# Patient Record
Sex: FEMALE | Race: WHITE | Employment: OTHER | URBAN - METROPOLITAN AREA
[De-identification: names, ages, dates, MRNs, and addresses within clinical notes are randomized per-mention and may not be internally consistent; named-entity substitution may affect disease eponyms.]

---

## 2017-02-01 ENCOUNTER — TELEPHONE (OUTPATIENT)
Dept: INTERNAL MEDICINE CLINIC | Age: 77
End: 2017-02-01

## 2017-02-01 RX ORDER — AZITHROMYCIN 250 MG/1
TABLET, FILM COATED ORAL
Qty: 1 PACKET | Refills: 0 | Status: SHIPPED | OUTPATIENT
Start: 2017-02-01 | End: 2017-02-11

## 2017-02-24 RX ORDER — SIMVASTATIN 20 MG
TABLET ORAL
Qty: 90 TABLET | Refills: 0 | Status: SHIPPED | OUTPATIENT
Start: 2017-02-24 | End: 2017-07-12 | Stop reason: SDUPTHER

## 2017-02-24 RX ORDER — LEVOTHYROXINE SODIUM 0.07 MG/1
TABLET ORAL
Qty: 90 TABLET | Refills: 0 | Status: SHIPPED | OUTPATIENT
Start: 2017-02-24 | End: 2017-07-12 | Stop reason: SDUPTHER

## 2017-02-24 RX ORDER — METOPROLOL TARTRATE 50 MG/1
TABLET, FILM COATED ORAL
Qty: 180 TABLET | Refills: 0 | Status: SHIPPED | OUTPATIENT
Start: 2017-02-24 | End: 2017-07-12 | Stop reason: SDUPTHER

## 2017-03-10 ENCOUNTER — OFFICE VISIT (OUTPATIENT)
Dept: INTERNAL MEDICINE CLINIC | Age: 77
End: 2017-03-10

## 2017-03-10 VITALS
RESPIRATION RATE: 14 BRPM | SYSTOLIC BLOOD PRESSURE: 120 MMHG | WEIGHT: 129 LBS | HEIGHT: 67 IN | DIASTOLIC BLOOD PRESSURE: 80 MMHG | HEART RATE: 80 BPM | BODY MASS INDEX: 20.25 KG/M2

## 2017-03-10 DIAGNOSIS — E03.9 ACQUIRED HYPOTHYROIDISM: ICD-10-CM

## 2017-03-10 DIAGNOSIS — M81.0 OSTEOPOROSIS: ICD-10-CM

## 2017-03-10 DIAGNOSIS — H34.11 CENTRAL RETINAL ARTERY OCCLUSION, RIGHT: ICD-10-CM

## 2017-03-10 DIAGNOSIS — E78.5 HYPERLIPIDEMIA, UNSPECIFIED HYPERLIPIDEMIA TYPE: Primary | ICD-10-CM

## 2017-03-10 DIAGNOSIS — M72.0 DUPUYTREN'S CONTRACTURE OF BOTH HANDS: ICD-10-CM

## 2017-03-10 LAB
A/G RATIO: 1.6 (ref 1.1–2.2)
ALBUMIN SERPL-MCNC: 4.6 G/DL (ref 3.4–5)
ALP BLD-CCNC: 48 U/L (ref 40–129)
ALT SERPL-CCNC: 13 U/L (ref 10–40)
ANION GAP SERPL CALCULATED.3IONS-SCNC: 14 MMOL/L (ref 3–16)
AST SERPL-CCNC: 23 U/L (ref 15–37)
BASOPHILS ABSOLUTE: 0 K/UL (ref 0–0.2)
BASOPHILS RELATIVE PERCENT: 0.8 %
BILIRUB SERPL-MCNC: 0.4 MG/DL (ref 0–1)
BUN BLDV-MCNC: 14 MG/DL (ref 7–20)
CALCIUM SERPL-MCNC: 10.2 MG/DL (ref 8.3–10.6)
CHLORIDE BLD-SCNC: 97 MMOL/L (ref 99–110)
CO2: 27 MMOL/L (ref 21–32)
CREAT SERPL-MCNC: 0.8 MG/DL (ref 0.6–1.2)
EOSINOPHILS ABSOLUTE: 0.1 K/UL (ref 0–0.6)
EOSINOPHILS RELATIVE PERCENT: 1.5 %
GFR AFRICAN AMERICAN: >60
GFR NON-AFRICAN AMERICAN: >60
GLOBULIN: 2.9 G/DL
GLUCOSE BLD-MCNC: 92 MG/DL (ref 70–99)
HCT VFR BLD CALC: 41.9 % (ref 36–48)
HEMOGLOBIN: 13.6 G/DL (ref 12–16)
LYMPHOCYTES ABSOLUTE: 1.3 K/UL (ref 1–5.1)
LYMPHOCYTES RELATIVE PERCENT: 25.5 %
MCH RBC QN AUTO: 28.9 PG (ref 26–34)
MCHC RBC AUTO-ENTMCNC: 32.5 G/DL (ref 31–36)
MCV RBC AUTO: 88.9 FL (ref 80–100)
MONOCYTES ABSOLUTE: 0.6 K/UL (ref 0–1.3)
MONOCYTES RELATIVE PERCENT: 11.2 %
NEUTROPHILS ABSOLUTE: 3.1 K/UL (ref 1.7–7.7)
NEUTROPHILS RELATIVE PERCENT: 61 %
PDW BLD-RTO: 14 % (ref 12.4–15.4)
PLATELET # BLD: 255 K/UL (ref 135–450)
PMV BLD AUTO: 9.4 FL (ref 5–10.5)
POTASSIUM SERPL-SCNC: 4.8 MMOL/L (ref 3.5–5.1)
RBC # BLD: 4.71 M/UL (ref 4–5.2)
SODIUM BLD-SCNC: 138 MMOL/L (ref 136–145)
TOTAL PROTEIN: 7.5 G/DL (ref 6.4–8.2)
TSH SERPL DL<=0.05 MIU/L-ACNC: 1.24 UIU/ML (ref 0.27–4.2)
WBC # BLD: 5.1 K/UL (ref 4–11)

## 2017-03-10 PROCEDURE — 99214 OFFICE O/P EST MOD 30 MIN: CPT | Performed by: INTERNAL MEDICINE

## 2017-03-10 ASSESSMENT — ENCOUNTER SYMPTOMS
ABDOMINAL PAIN: 0
SHORTNESS OF BREATH: 0
RHINORRHEA: 0
WHEEZING: 0
NAUSEA: 0
VOMITING: 0

## 2017-03-10 ASSESSMENT — PATIENT HEALTH QUESTIONNAIRE - PHQ9
SUM OF ALL RESPONSES TO PHQ9 QUESTIONS 1 & 2: 0
SUM OF ALL RESPONSES TO PHQ QUESTIONS 1-9: 0
2. FEELING DOWN, DEPRESSED OR HOPELESS: 0
1. LITTLE INTEREST OR PLEASURE IN DOING THINGS: 0

## 2017-08-09 ENCOUNTER — OFFICE VISIT (OUTPATIENT)
Dept: INTERNAL MEDICINE CLINIC | Age: 77
End: 2017-08-09

## 2017-08-09 VITALS
DIASTOLIC BLOOD PRESSURE: 80 MMHG | HEART RATE: 70 BPM | RESPIRATION RATE: 14 BRPM | BODY MASS INDEX: 20.56 KG/M2 | HEIGHT: 67 IN | SYSTOLIC BLOOD PRESSURE: 138 MMHG | WEIGHT: 131 LBS

## 2017-08-09 DIAGNOSIS — H34.11 CENTRAL RETINAL ARTERY OCCLUSION, RIGHT: ICD-10-CM

## 2017-08-09 DIAGNOSIS — M81.0 OSTEOPOROSIS WITHOUT CURRENT PATHOLOGICAL FRACTURE, UNSPECIFIED OSTEOPOROSIS TYPE: ICD-10-CM

## 2017-08-09 DIAGNOSIS — E78.5 HYPERLIPIDEMIA, UNSPECIFIED HYPERLIPIDEMIA TYPE: Primary | ICD-10-CM

## 2017-08-09 DIAGNOSIS — E78.5 HYPERLIPIDEMIA, UNSPECIFIED HYPERLIPIDEMIA TYPE: ICD-10-CM

## 2017-08-09 DIAGNOSIS — E03.9 ACQUIRED HYPOTHYROIDISM: ICD-10-CM

## 2017-08-09 DIAGNOSIS — M72.0 DUPUYTREN'S CONTRACTURE OF BOTH HANDS: ICD-10-CM

## 2017-08-09 LAB
A/G RATIO: 1.9 (ref 1.1–2.2)
ALBUMIN SERPL-MCNC: 5 G/DL (ref 3.4–5)
ALP BLD-CCNC: 57 U/L (ref 40–129)
ALT SERPL-CCNC: 22 U/L (ref 10–40)
ANION GAP SERPL CALCULATED.3IONS-SCNC: 18 MMOL/L (ref 3–16)
AST SERPL-CCNC: 28 U/L (ref 15–37)
BASOPHILS ABSOLUTE: 0 K/UL (ref 0–0.2)
BASOPHILS RELATIVE PERCENT: 0.6 %
BILIRUB SERPL-MCNC: 0.4 MG/DL (ref 0–1)
BUN BLDV-MCNC: 9 MG/DL (ref 7–20)
CALCIUM SERPL-MCNC: 9.9 MG/DL (ref 8.3–10.6)
CHLORIDE BLD-SCNC: 99 MMOL/L (ref 99–110)
CHOLESTEROL, TOTAL: 211 MG/DL (ref 0–199)
CO2: 28 MMOL/L (ref 21–32)
CREAT SERPL-MCNC: 0.7 MG/DL (ref 0.6–1.2)
EOSINOPHILS ABSOLUTE: 0.2 K/UL (ref 0–0.6)
EOSINOPHILS RELATIVE PERCENT: 3.8 %
GFR AFRICAN AMERICAN: >60
GFR NON-AFRICAN AMERICAN: >60
GLOBULIN: 2.6 G/DL
GLUCOSE BLD-MCNC: 92 MG/DL (ref 70–99)
HCT VFR BLD CALC: 40.9 % (ref 36–48)
HDLC SERPL-MCNC: 75 MG/DL (ref 40–60)
HEMOGLOBIN: 13.6 G/DL (ref 12–16)
LDL CHOLESTEROL CALCULATED: 107 MG/DL
LYMPHOCYTES ABSOLUTE: 1.4 K/UL (ref 1–5.1)
LYMPHOCYTES RELATIVE PERCENT: 28.3 %
MCH RBC QN AUTO: 29.4 PG (ref 26–34)
MCHC RBC AUTO-ENTMCNC: 33.2 G/DL (ref 31–36)
MCV RBC AUTO: 88.5 FL (ref 80–100)
MONOCYTES ABSOLUTE: 0.6 K/UL (ref 0–1.3)
MONOCYTES RELATIVE PERCENT: 12.5 %
NEUTROPHILS ABSOLUTE: 2.7 K/UL (ref 1.7–7.7)
NEUTROPHILS RELATIVE PERCENT: 54.8 %
PDW BLD-RTO: 14.3 % (ref 12.4–15.4)
PLATELET # BLD: 222 K/UL (ref 135–450)
PMV BLD AUTO: 9.5 FL (ref 5–10.5)
POTASSIUM SERPL-SCNC: 4.9 MMOL/L (ref 3.5–5.1)
RBC # BLD: 4.62 M/UL (ref 4–5.2)
SODIUM BLD-SCNC: 145 MMOL/L (ref 136–145)
TOTAL PROTEIN: 7.6 G/DL (ref 6.4–8.2)
TRIGL SERPL-MCNC: 143 MG/DL (ref 0–150)
TSH SERPL DL<=0.05 MIU/L-ACNC: 1.69 UIU/ML (ref 0.27–4.2)
URIC ACID, SERUM: 4.8 MG/DL (ref 2.6–6)
VLDLC SERPL CALC-MCNC: 29 MG/DL
WBC # BLD: 4.9 K/UL (ref 4–11)

## 2017-08-09 PROCEDURE — 99214 OFFICE O/P EST MOD 30 MIN: CPT | Performed by: INTERNAL MEDICINE

## 2017-08-09 RX ORDER — SIMVASTATIN 20 MG
TABLET ORAL
Qty: 90 TABLET | Refills: 0 | Status: SHIPPED | OUTPATIENT
Start: 2017-08-09 | End: 2018-04-20 | Stop reason: SDUPTHER

## 2017-08-09 RX ORDER — METOPROLOL TARTRATE 50 MG/1
TABLET, FILM COATED ORAL
Qty: 180 TABLET | Refills: 0 | Status: SHIPPED | OUTPATIENT
Start: 2017-08-09 | End: 2018-06-18 | Stop reason: SDUPTHER

## 2017-08-09 RX ORDER — LEVOTHYROXINE SODIUM 0.07 MG/1
TABLET ORAL
Qty: 90 TABLET | Refills: 0 | Status: SHIPPED | OUTPATIENT
Start: 2017-08-09 | End: 2018-04-20 | Stop reason: SDUPTHER

## 2017-08-09 ASSESSMENT — ENCOUNTER SYMPTOMS
WHEEZING: 0
VOMITING: 0
RHINORRHEA: 0
ABDOMINAL PAIN: 0
SHORTNESS OF BREATH: 0
NAUSEA: 0

## 2017-09-13 ENCOUNTER — OFFICE VISIT (OUTPATIENT)
Dept: ORTHOPEDIC SURGERY | Age: 77
End: 2017-09-13

## 2017-09-13 ENCOUNTER — TELEPHONE (OUTPATIENT)
Dept: ORTHOPEDIC SURGERY | Age: 77
End: 2017-09-13

## 2017-09-13 VITALS — WEIGHT: 130.95 LBS | BODY MASS INDEX: 20.55 KG/M2 | HEIGHT: 67 IN

## 2017-09-13 DIAGNOSIS — M72.0 DUPUYTREN'S CONTRACTURE OF LEFT HAND: Primary | ICD-10-CM

## 2017-09-13 PROCEDURE — 99213 OFFICE O/P EST LOW 20 MIN: CPT | Performed by: ORTHOPAEDIC SURGERY

## 2017-09-20 ENCOUNTER — PAT TELEPHONE (OUTPATIENT)
Dept: PREADMISSION TESTING | Age: 77
End: 2017-09-20

## 2017-09-20 ENCOUNTER — OFFICE VISIT (OUTPATIENT)
Dept: INTERNAL MEDICINE CLINIC | Age: 77
End: 2017-09-20

## 2017-09-20 VITALS
HEART RATE: 61 BPM | RESPIRATION RATE: 14 BRPM | BODY MASS INDEX: 20.4 KG/M2 | DIASTOLIC BLOOD PRESSURE: 80 MMHG | WEIGHT: 130 LBS | SYSTOLIC BLOOD PRESSURE: 142 MMHG

## 2017-09-20 DIAGNOSIS — M72.0 DUPUYTREN'S CONTRACTURE OF BOTH HANDS: ICD-10-CM

## 2017-09-20 DIAGNOSIS — M81.0 OSTEOPOROSIS, UNSPECIFIED OSTEOPOROSIS TYPE, UNSPECIFIED PATHOLOGICAL FRACTURE PRESENCE: ICD-10-CM

## 2017-09-20 DIAGNOSIS — Z01.818 PRE-OP EXAM: Primary | ICD-10-CM

## 2017-09-20 DIAGNOSIS — I10 BENIGN ESSENTIAL HYPERTENSION: ICD-10-CM

## 2017-09-20 DIAGNOSIS — E78.5 HYPERLIPIDEMIA, UNSPECIFIED HYPERLIPIDEMIA TYPE: ICD-10-CM

## 2017-09-20 DIAGNOSIS — E03.9 ACQUIRED HYPOTHYROIDISM: ICD-10-CM

## 2017-09-20 PROCEDURE — 93000 ELECTROCARDIOGRAM COMPLETE: CPT | Performed by: NURSE PRACTITIONER

## 2017-09-20 PROCEDURE — 99213 OFFICE O/P EST LOW 20 MIN: CPT | Performed by: NURSE PRACTITIONER

## 2017-09-20 RX ORDER — IBUPROFEN 200 MG
1 CAPSULE ORAL DAILY
COMMUNITY

## 2017-09-20 RX ORDER — PREDNISONE 20 MG/1
20 TABLET ORAL DAILY
Qty: 5 TABLET | Refills: 0 | Status: SHIPPED | OUTPATIENT
Start: 2017-09-20 | End: 2017-09-25

## 2017-09-28 ENCOUNTER — HOSPITAL ENCOUNTER (OUTPATIENT)
Dept: SURGERY | Age: 77
Discharge: HOME | End: 2017-09-28
Attending: ORTHOPAEDIC SURGERY | Admitting: ORTHOPAEDIC SURGERY

## 2017-09-28 VITALS
HEIGHT: 67 IN | RESPIRATION RATE: 14 BRPM | SYSTOLIC BLOOD PRESSURE: 144 MMHG | BODY MASS INDEX: 20.4 KG/M2 | OXYGEN SATURATION: 97 % | WEIGHT: 130 LBS | HEART RATE: 61 BPM | TEMPERATURE: 98.2 F | DIASTOLIC BLOOD PRESSURE: 80 MMHG

## 2017-09-28 RX ORDER — LABETALOL HYDROCHLORIDE 5 MG/ML
5 INJECTION, SOLUTION INTRAVENOUS EVERY 10 MIN PRN
Status: DISCONTINUED | OUTPATIENT
Start: 2017-09-28 | End: 2017-09-29 | Stop reason: HOSPADM

## 2017-09-28 RX ORDER — DEXTROSE MONOHYDRATE 50 MG/ML
INJECTION, SOLUTION INTRAVENOUS
Status: COMPLETED
Start: 2017-09-28 | End: 2017-09-28

## 2017-09-28 RX ORDER — HYDROMORPHONE HCL 110MG/55ML
0.5 PATIENT CONTROLLED ANALGESIA SYRINGE INTRAVENOUS EVERY 5 MIN PRN
Status: DISCONTINUED | OUTPATIENT
Start: 2017-09-28 | End: 2017-09-29 | Stop reason: HOSPADM

## 2017-09-28 RX ORDER — SODIUM CHLORIDE 0.9 % (FLUSH) 0.9 %
10 SYRINGE (ML) INJECTION EVERY 12 HOURS SCHEDULED
Status: DISCONTINUED | OUTPATIENT
Start: 2017-09-28 | End: 2017-09-29 | Stop reason: HOSPADM

## 2017-09-28 RX ORDER — TRAMADOL HYDROCHLORIDE 50 MG/1
50 TABLET ORAL EVERY 8 HOURS PRN
Qty: 20 TABLET | Refills: 0 | Status: SHIPPED | OUTPATIENT
Start: 2017-09-28 | End: 2017-10-08

## 2017-09-28 RX ORDER — OXYCODONE HYDROCHLORIDE AND ACETAMINOPHEN 5; 325 MG/1; MG/1
1 TABLET ORAL PRN
Status: COMPLETED | OUTPATIENT
Start: 2017-09-28 | End: 2017-09-28

## 2017-09-28 RX ORDER — MEPERIDINE HYDROCHLORIDE 25 MG/ML
12.5 INJECTION INTRAMUSCULAR; INTRAVENOUS; SUBCUTANEOUS EVERY 5 MIN PRN
Status: DISCONTINUED | OUTPATIENT
Start: 2017-09-28 | End: 2017-09-29 | Stop reason: HOSPADM

## 2017-09-28 RX ORDER — OXYCODONE HYDROCHLORIDE AND ACETAMINOPHEN 5; 325 MG/1; MG/1
2 TABLET ORAL PRN
Status: COMPLETED | OUTPATIENT
Start: 2017-09-28 | End: 2017-09-28

## 2017-09-28 RX ORDER — DIPHENHYDRAMINE HYDROCHLORIDE 50 MG/ML
12.5 INJECTION INTRAMUSCULAR; INTRAVENOUS
Status: ACTIVE | OUTPATIENT
Start: 2017-09-28 | End: 2017-09-28

## 2017-09-28 RX ORDER — LIDOCAINE HYDROCHLORIDE 10 MG/ML
1 INJECTION, SOLUTION EPIDURAL; INFILTRATION; INTRACAUDAL; PERINEURAL
Status: COMPLETED | OUTPATIENT
Start: 2017-09-28 | End: 2017-09-28

## 2017-09-28 RX ORDER — SODIUM CHLORIDE 0.9 % (FLUSH) 0.9 %
10 SYRINGE (ML) INJECTION PRN
Status: DISCONTINUED | OUTPATIENT
Start: 2017-09-28 | End: 2017-09-29 | Stop reason: HOSPADM

## 2017-09-28 RX ORDER — PROMETHAZINE HYDROCHLORIDE 25 MG/ML
6.25 INJECTION, SOLUTION INTRAMUSCULAR; INTRAVENOUS
Status: ACTIVE | OUTPATIENT
Start: 2017-09-28 | End: 2017-09-28

## 2017-09-28 RX ORDER — ONDANSETRON 2 MG/ML
4 INJECTION INTRAMUSCULAR; INTRAVENOUS
Status: ACTIVE | OUTPATIENT
Start: 2017-09-28 | End: 2017-09-28

## 2017-09-28 RX ORDER — SODIUM CHLORIDE, SODIUM LACTATE, POTASSIUM CHLORIDE, CALCIUM CHLORIDE 600; 310; 30; 20 MG/100ML; MG/100ML; MG/100ML; MG/100ML
INJECTION, SOLUTION INTRAVENOUS CONTINUOUS
Status: DISCONTINUED | OUTPATIENT
Start: 2017-09-28 | End: 2017-09-29 | Stop reason: HOSPADM

## 2017-09-28 RX ORDER — HYDROMORPHONE HCL 110MG/55ML
0.25 PATIENT CONTROLLED ANALGESIA SYRINGE INTRAVENOUS EVERY 5 MIN PRN
Status: DISCONTINUED | OUTPATIENT
Start: 2017-09-28 | End: 2017-09-29 | Stop reason: HOSPADM

## 2017-09-28 RX ORDER — LIDOCAINE HYDROCHLORIDE 10 MG/ML
INJECTION, SOLUTION EPIDURAL; INFILTRATION; INTRACAUDAL; PERINEURAL
Status: DISPENSED
Start: 2017-09-28 | End: 2017-09-28

## 2017-09-28 RX ORDER — HYDRALAZINE HYDROCHLORIDE 20 MG/ML
5 INJECTION INTRAMUSCULAR; INTRAVENOUS EVERY 10 MIN PRN
Status: DISCONTINUED | OUTPATIENT
Start: 2017-09-28 | End: 2017-09-29 | Stop reason: HOSPADM

## 2017-09-28 RX ORDER — SODIUM CHLORIDE, SODIUM LACTATE, POTASSIUM CHLORIDE, CALCIUM CHLORIDE 600; 310; 30; 20 MG/100ML; MG/100ML; MG/100ML; MG/100ML
INJECTION, SOLUTION INTRAVENOUS
Status: COMPLETED
Start: 2017-09-28 | End: 2017-09-28

## 2017-09-28 RX ORDER — MORPHINE SULFATE 10 MG/ML
1 INJECTION, SOLUTION INTRAMUSCULAR; INTRAVENOUS EVERY 5 MIN PRN
Status: DISCONTINUED | OUTPATIENT
Start: 2017-09-28 | End: 2017-09-29 | Stop reason: HOSPADM

## 2017-09-28 RX ORDER — MORPHINE SULFATE 10 MG/ML
2 INJECTION, SOLUTION INTRAMUSCULAR; INTRAVENOUS EVERY 5 MIN PRN
Status: DISCONTINUED | OUTPATIENT
Start: 2017-09-28 | End: 2017-09-29 | Stop reason: HOSPADM

## 2017-09-28 RX ADMIN — OXYCODONE HYDROCHLORIDE AND ACETAMINOPHEN 1 TABLET: 5; 325 TABLET ORAL at 12:26

## 2017-09-28 RX ADMIN — LIDOCAINE HYDROCHLORIDE 1 ML: 10 INJECTION, SOLUTION EPIDURAL; INFILTRATION; INTRACAUDAL; PERINEURAL at 08:52

## 2017-09-28 RX ADMIN — DEXTROSE MONOHYDRATE: 50 INJECTION, SOLUTION INTRAVENOUS at 10:22

## 2017-09-28 RX ADMIN — SODIUM CHLORIDE, SODIUM LACTATE, POTASSIUM CHLORIDE, CALCIUM CHLORIDE 1000 ML: 600; 310; 30; 20 INJECTION, SOLUTION INTRAVENOUS at 08:51

## 2017-09-28 ASSESSMENT — PAIN DESCRIPTION - FREQUENCY: FREQUENCY: CONTINUOUS

## 2017-09-28 ASSESSMENT — PAIN DESCRIPTION - DESCRIPTORS
DESCRIPTORS: ACHING
DESCRIPTORS: ACHING;DULL
DESCRIPTORS: ACHING;OTHER (COMMENT)
DESCRIPTORS: BURNING;DULL

## 2017-09-28 ASSESSMENT — PAIN SCALES - GENERAL
PAINLEVEL_OUTOF10: 4
PAINLEVEL_OUTOF10: 2
PAINLEVEL_OUTOF10: 3

## 2017-09-28 ASSESSMENT — PAIN DESCRIPTION - LOCATION
LOCATION: HAND

## 2017-09-28 ASSESSMENT — PAIN DESCRIPTION - PAIN TYPE
TYPE: OTHER (COMMENT)
TYPE: SURGICAL PAIN

## 2017-09-28 ASSESSMENT — PAIN DESCRIPTION - ORIENTATION
ORIENTATION: LEFT

## 2017-09-28 ASSESSMENT — PAIN - FUNCTIONAL ASSESSMENT: PAIN_FUNCTIONAL_ASSESSMENT: 0-10

## 2017-10-06 ENCOUNTER — OFFICE VISIT (OUTPATIENT)
Dept: ORTHOPEDIC SURGERY | Age: 77
End: 2017-10-06

## 2017-10-06 VITALS — HEIGHT: 67 IN | BODY MASS INDEX: 20.42 KG/M2 | WEIGHT: 130.07 LBS

## 2017-10-06 DIAGNOSIS — M72.0 DUPUYTREN'S CONTRACTURE OF LEFT HAND: Primary | ICD-10-CM

## 2017-10-06 PROCEDURE — 99024 POSTOP FOLLOW-UP VISIT: CPT | Performed by: ORTHOPAEDIC SURGERY

## 2017-10-06 NOTE — MR AVS SNAPSHOT
After Visit Summary             Ez Tate   10/6/2017 10:30 AM   Office Visit    Description:  Female : 1940   Provider:  Marcia Sheridan MD   Department:  [address] [place] and Future Appointments         Below is a list of your follow-up and future appointments. This may not be a complete list as you may have made appointments directly with providers that we are not aware of or your providers may have made some for you. Please call your providers to confirm appointments. It is important to keep your appointments. Please bring your current insurance card, photo ID, co-pay, and all medication bottles to your appointment. If self-pay, payment is expected at the time of service. Your To-Do List     Future Appointments Provider Department Dept Phone    2017 10:45 AM Marcia Sheridan MD [facility] [phone]    Follow-Up    Return in about 6 weeks (around 2017).          Information from Your Visit        Department     Name Address Phone Fax    [facility] [address] [phone]      You Were Seen for:         Comments    Dupuytren's contracture of left hand   [[id number]]         Vital Signs     Height Weight Body Mass Index Smoking Status          5' 6.93\" (1.7 m) 130 lb 1.1 oz (59 kg) 20.42 kg/m2 Never Smoker           Medications and Orders      Your Current Medications Are              traMADol (ULTRAM) 50 MG tablet Take 1 tablet by mouth every 8 hours as needed for Pain    calcium carbonate (OYSTER SHELL CALCIUM 500 MG) 1250 (500 Ca) MG tablet Take 1 tablet by mouth daily    vitamin D 1000 units CAPS Take by mouth    levothyroxine (SYNTHROID) 75 MCG tablet TAKE 1 TABLET EVERY DAY    simvastatin (ZOCOR) 20 MG tablet TAKE 1 TABLET EVERY EVENING [email or web address]  2. Click on the Sign Up Now link in the Sign In box. You will see the New Member Sign Up page. 3. Enter your [email or web address] Access Code exactly as it appears below. You will not need to use this code after youve completed the sign-up process. If you do not sign up before the expiration date, you must request a new code. Iotera Access Code: [email or web address]  Expires: 10/8/2017  8:59 AM    4. Enter your Social Security Number ([id number]) and Date of Birth (mm/dd/yyyy) as indicated and click Submit. You will be taken to the next sign-up page. 5. Create a GroupVisual[employer]iot ID. This will be your Iotera login ID and cannot be changed, so think of one that is secure and easy to remember. 6. Create a [employer] password. You can change your password at any time. 7. Enter your Password Reset Question and Answer. This can be used at a later time if you forget your password. 8. Enter your e-mail address. You will receive e-mail notification when new information is available in [address] 9. Click Sign Up. You can now view your medical record. Additional Information  If you have questions, please contact the physician practice where you receive care. Remember, Iotera is NOT to be used for urgent needs. For medical emergencies, dial 911. For questions regarding your Iot[employer] account call [phone]. If you have a clinical question, please call your doctor's office.

## 2017-10-06 NOTE — PROGRESS NOTES
Chief Complaint   Patient presents with    Post-Op Check     Fasciectomy of left hand with removal of Dupuytren disease IF, palm and left thumb sx 9/28/2017       HISTORY OF PRESENT ILLNESS:  Narda Holcomb is a 76 y.o.  patient here for repeat evaluation after undergoing left hand Dupuytren's excision 8 days ago. She feels minimal pain. She feels stiffness. She states that there is some tingling of the index finger but already improving. Overall she is happy. ROS:  ROS neg     Past medical history is reviewed again today, no changes to report    PHYSICAL EXAMINATION:  Patient is alert and pleasant, in no acute distress. The affected extremity is examined today. Left hand reveals well-healing surgical sites. Mild swelling. Ecchymosis is noted. Compartments soft. No sign of infection or dehiscence. Good alignment of the hand at rest.  Palpation in the webspace between the index and middle finger reveals resolution of the cord. The incision in the palm and along the volar index finger are both healing well. Full extension is present. Stiffness with flexion but no clicking. Fingers are warm and sensate. There is sensory function on the volar surface of the left index finger, radially and ulnarly but she reports that it is slightly decreased to the other digits. X-rays: None needed    Pathology report is reviewed, impression:  Dupuytren's disease    IMPRESSION AND PLAN: Dupuytren's disease left hand, 1 week following excision  Doing well. We will continue with our current care plan. Sutures are absorbable. The wounds are cleansed today and covered with Steri-Strips. We will also provide an Ace bandage. I did recommend therapy, she declines. Therefore we did provide range of motion and edema control recommendations in office. In addition we have printed a range of motion sheet to be done at home. She will follow up in 6 weeks unless needed sooner.   If she is not getting her motion back or function back, she understands to contact my office and we would proceed with the outpatient postoperative hand therapy. All questions and concerns were addressed today. Patient is in agreement with the plan.         José Miguel Acharya MD  Hand & Upper Extremity Surgery  [address] [facility] partner of [facility] ([facility])

## 2017-10-18 ENCOUNTER — TELEPHONE (OUTPATIENT)
Dept: INTERNAL MEDICINE CLINIC | Age: 77
End: 2017-10-18

## 2017-10-18 NOTE — TELEPHONE ENCOUNTER
----- Message from Alley Childs MD sent at 10/18/2017 10:28 AM EDT -----  Contact: pt [phone]  Continue to monitor and call back on Friday morning  I am have to add another BP med  ----- Message -----  From: Jelani Pierre  Sent: 10/18/2017  10:22 AM  To: Alley Childs MD    Has been going on for the last 2-3 days. ----- Message -----  From: Alley Childs MD  Sent: 10/18/2017  10:15 AM  To: Jelani Pierre    Is this the first time it has happened. ----- Message -----  From: Connie Yulianabird  Sent: 10/18/2017   9:40 AM  To: Alley Childs MD    Took bp last night and this morning and it was 171/98 this morning. Pulse is good but the blood pressure has been around 170 to 178/98 to 102. Tried talking her in to coming in today but she said she didn't want to.   Wanted to see what you said first.     Last visit: 9-20-17  Future visit: none

## 2017-10-20 ENCOUNTER — TELEPHONE (OUTPATIENT)
Dept: INTERNAL MEDICINE CLINIC | Age: 77
End: 2017-10-20

## 2017-10-20 RX ORDER — AMLODIPINE BESYLATE 2.5 MG/1
2.5 TABLET ORAL DAILY
Qty: 30 TABLET | Refills: 0 | Status: SHIPPED | OUTPATIENT
Start: 2017-10-20 | End: 2017-10-30 | Stop reason: DRUGHIGH

## 2017-10-20 RX ORDER — AMLODIPINE BESYLATE 2.5 MG/1
2.5 TABLET ORAL DAILY
Qty: 90 TABLET | Refills: 0 | Status: SHIPPED | OUTPATIENT
Start: 2017-10-20 | End: 2017-10-30 | Stop reason: DRUGHIGH

## 2017-10-20 NOTE — TELEPHONE ENCOUNTER
----- Message from Tania Kathleen MD sent at 10/20/2017  3:22 PM EDT -----  Contact: patient/ [phone]  Start Norvasc 2.5 mg daily  ----- Message -----  From: Katt Jason  Sent: 10/20/2017  12:59 PM  To: Tania Kathleen MD    Her BP is up in the am and goes down in the pm  Yesterday am 178/92                   Pm  148/83  Today am 164/97             Pm  137/79      [facility]    Was here 9/20

## 2017-10-30 ENCOUNTER — NURSE ONLY (OUTPATIENT)
Dept: INTERNAL MEDICINE CLINIC | Age: 77
End: 2017-10-30

## 2017-10-30 RX ORDER — AMLODIPINE BESYLATE 5 MG/1
5 TABLET ORAL DAILY
Qty: 90 TABLET | Refills: 0 | Status: SHIPPED | OUTPATIENT
Start: 2017-10-30 | End: 2018-01-15 | Stop reason: SDUPTHER

## 2017-11-01 ENCOUNTER — NURSE ONLY (OUTPATIENT)
Dept: INTERNAL MEDICINE CLINIC | Age: 77
End: 2017-11-01

## 2017-11-02 ENCOUNTER — TELEPHONE (OUTPATIENT)
Dept: INTERNAL MEDICINE CLINIC | Age: 77
End: 2017-11-02

## 2017-11-02 NOTE — TELEPHONE ENCOUNTER
----- Message from Kennon Galeazzi, MD sent at 11/2/2017 10:28 AM EDT -----  Contact: [facility]  5 mg  ----- Message -----  From: Jose D Mamie  Sent: 11/2/2017  10:25 AM  To: Kennon Galeazzi, MD    Pharmacy wanting to know if current therapy is amlodipine 5mg or 2.5mg

## 2017-11-15 ENCOUNTER — OFFICE VISIT (OUTPATIENT)
Dept: INTERNAL MEDICINE CLINIC | Age: 77
End: 2017-11-15

## 2017-11-15 VITALS
RESPIRATION RATE: 14 BRPM | HEIGHT: 67 IN | BODY MASS INDEX: 20.4 KG/M2 | HEART RATE: 70 BPM | WEIGHT: 130 LBS | SYSTOLIC BLOOD PRESSURE: 158 MMHG | DIASTOLIC BLOOD PRESSURE: 80 MMHG

## 2017-11-15 DIAGNOSIS — E03.9 ACQUIRED HYPOTHYROIDISM: ICD-10-CM

## 2017-11-15 DIAGNOSIS — E78.5 HYPERLIPIDEMIA, UNSPECIFIED HYPERLIPIDEMIA TYPE: ICD-10-CM

## 2017-11-15 DIAGNOSIS — I10 BENIGN ESSENTIAL HYPERTENSION: Primary | ICD-10-CM

## 2017-11-15 DIAGNOSIS — M81.0 OSTEOPOROSIS WITHOUT CURRENT PATHOLOGICAL FRACTURE, UNSPECIFIED OSTEOPOROSIS TYPE: ICD-10-CM

## 2017-11-15 DIAGNOSIS — Z23 NEED FOR INFLUENZA VACCINATION: ICD-10-CM

## 2017-11-15 DIAGNOSIS — H34.11 CENTRAL RETINAL ARTERY OCCLUSION OF RIGHT EYE: ICD-10-CM

## 2017-11-15 PROCEDURE — 99214 OFFICE O/P EST MOD 30 MIN: CPT | Performed by: INTERNAL MEDICINE

## 2017-11-15 PROCEDURE — G8399 PT W/DXA RESULTS DOCUMENT: HCPCS | Performed by: INTERNAL MEDICINE

## 2017-11-15 PROCEDURE — 1090F PRES/ABSN URINE INCON ASSESS: CPT | Performed by: INTERNAL MEDICINE

## 2017-11-15 PROCEDURE — G8420 CALC BMI NORM PARAMETERS: HCPCS | Performed by: INTERNAL MEDICINE

## 2017-11-15 PROCEDURE — 1036F TOBACCO NON-USER: CPT | Performed by: INTERNAL MEDICINE

## 2017-11-15 PROCEDURE — 4040F PNEUMOC VAC/ADMIN/RCVD: CPT | Performed by: INTERNAL MEDICINE

## 2017-11-15 PROCEDURE — G0008 ADMIN INFLUENZA VIRUS VAC: HCPCS | Performed by: INTERNAL MEDICINE

## 2017-11-15 PROCEDURE — 90662 IIV NO PRSV INCREASED AG IM: CPT | Performed by: INTERNAL MEDICINE

## 2017-11-15 PROCEDURE — 1123F ACP DISCUSS/DSCN MKR DOCD: CPT | Performed by: INTERNAL MEDICINE

## 2017-11-15 PROCEDURE — 4005F PHARM THX FOR OP RXD: CPT | Performed by: INTERNAL MEDICINE

## 2017-11-15 PROCEDURE — G8484 FLU IMMUNIZE NO ADMIN: HCPCS | Performed by: INTERNAL MEDICINE

## 2017-11-15 PROCEDURE — G8427 DOCREV CUR MEDS BY ELIG CLIN: HCPCS | Performed by: INTERNAL MEDICINE

## 2017-11-15 ASSESSMENT — ENCOUNTER SYMPTOMS
VOMITING: 0
WHEEZING: 0
RHINORRHEA: 0
SHORTNESS OF BREATH: 0
ABDOMINAL PAIN: 0
NAUSEA: 0

## 2017-11-15 NOTE — PROGRESS NOTES
Subjective:      Patient ID: Luis Felipe Otto is a 76 y.o. female. HPI    Patient is here for follow up of hypertension, hyperlipidemia, hypothyroidism, central retinal artery occlusion and osteoporosis. Patient's BP is not controlled on current medications. No CP or dyspnea. Her BP was high and I put her on Norvasc. She stopped her Metoprolol at that time. Her cholesterol is at goal. No myalgias. Patient's hypothyroidism is stable on replacement therapy. No diarrhea or constipation. Her vision is unchanged. 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 She has osteoporosis. She does not want to do Prolia. She is taking Calcium. Review of Systems   Constitutional: Negative for appetite change and fatigue. HENT: Negative for postnasal drip and rhinorrhea. Respiratory: Negative for shortness of breath and wheezing. Cardiovascular: Negative for chest pain and palpitations. Gastrointestinal: Negative for abdominal pain, nausea and vomiting. Endocrine: Negative for cold intolerance, heat intolerance and polyphagia. Musculoskeletal: Negative for joint swelling. Skin: Negative for rash. Neurological: Negative for light-headedness. Psychiatric/Behavioral: Negative for sleep disturbance. Current Outpatient Prescriptions   Medication Sig Dispense Refill    amLODIPine (NORVASC) 5 MG tablet Take 1 tablet by mouth daily 90 tablet 0    calcium carbonate (OYSTER SHELL CALCIUM 500 MG) 1250 (500 Ca) MG tablet Take 1 tablet by mouth daily      vitamin D 1000 units CAPS Take by mouth      levothyroxine (SYNTHROID) 75 MCG tablet TAKE 1 TABLET EVERY DAY 90 tablet 0    simvastatin (ZOCOR) 20 MG tablet TAKE 1 TABLET EVERY EVENING 90 tablet 0    NONFORMULARY Indications: Chloracedin      Multiple Vitamin (MULTI-VITAMIN) TABS Take  by mouth.  aspirin 81 MG EC tablet Take 81 mg by mouth daily.       metoprolol tartrate (LOPRESSOR) 50 MG tablet TAKE 1 TABLET TWICE DAILY 180 tablet 0     No SYR, 0.5ML (FLUZONE HD)          Plan:         HTN: resume Metoprolol. Continue Norvasc. Hypothyroidism:  Patient's hypothyroidism is stable on replacement therapy. Hyperlipidemia:  At goal. On Zocor. 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 Osteoporosis-  On calcium with vitamin D. Refuses other treatment. She had surgery on her left hand. She is recovering from it. Continue exercise. Decrease calorie intake. The current medical regimen is effective;  continue present plan and medications. See orders.

## 2017-11-17 ENCOUNTER — OFFICE VISIT (OUTPATIENT)
Dept: ORTHOPEDIC SURGERY | Age: 77
End: 2017-11-17

## 2017-11-17 VITALS
HEIGHT: 67 IN | BODY MASS INDEX: 20.73 KG/M2 | DIASTOLIC BLOOD PRESSURE: 66 MMHG | HEART RATE: 61 BPM | WEIGHT: 132.06 LBS | SYSTOLIC BLOOD PRESSURE: 134 MMHG

## 2017-11-17 DIAGNOSIS — M72.0 DUPUYTREN'S CONTRACTURE OF LEFT HAND: Primary | ICD-10-CM

## 2017-11-17 PROCEDURE — 99024 POSTOP FOLLOW-UP VISIT: CPT | Performed by: ORTHOPAEDIC SURGERY

## 2018-01-02 ENCOUNTER — TELEPHONE (OUTPATIENT)
Dept: INTERNAL MEDICINE CLINIC | Age: 78
End: 2018-01-02

## 2018-01-02 RX ORDER — CEPHALEXIN 250 MG/1
250 CAPSULE ORAL 3 TIMES DAILY
Qty: 21 CAPSULE | Refills: 0 | Status: SHIPPED | OUTPATIENT
Start: 2018-01-02 | End: 2018-01-09

## 2018-01-02 NOTE — TELEPHONE ENCOUNTER
----- Message from Leigha Borjas MD sent at 1/2/2018  1:22 PM EST -----  Contact: pt [phone]  Keflex 250 mg po tid # 21    ----- Message -----  From: Gabo Antonio  Sent: 1/2/2018  11:27 AM  To: Leigha Borjas MD    Has had a uti since Friday. Got better but she is still not feeling good. Wants to know if you will send something in for her to get rid of it.      [facility]     Last visit: 11-15-17  Future visit: none

## 2018-01-15 RX ORDER — AMLODIPINE BESYLATE 5 MG/1
TABLET ORAL
Qty: 90 TABLET | Refills: 0 | Status: SHIPPED | OUTPATIENT
Start: 2018-01-15 | End: 2018-06-18 | Stop reason: SDUPTHER

## 2018-04-03 ENCOUNTER — HOSPITAL ENCOUNTER (OUTPATIENT)
Dept: OTHER | Age: 78
Discharge: HOME | End: 2018-04-03
Attending: INTERNAL MEDICINE | Admitting: INTERNAL MEDICINE

## 2018-04-03 ENCOUNTER — OFFICE VISIT (OUTPATIENT)
Dept: INTERNAL MEDICINE CLINIC | Age: 78
End: 2018-04-03

## 2018-04-03 VITALS
SYSTOLIC BLOOD PRESSURE: 130 MMHG | OXYGEN SATURATION: 97 % | WEIGHT: 130 LBS | TEMPERATURE: 97.7 F | RESPIRATION RATE: 14 BRPM | HEART RATE: 90 BPM | DIASTOLIC BLOOD PRESSURE: 80 MMHG | HEIGHT: 67 IN | BODY MASS INDEX: 20.4 KG/M2

## 2018-04-03 DIAGNOSIS — R07.81 PLEURITIC CHEST PAIN: Primary | ICD-10-CM

## 2018-04-03 DIAGNOSIS — R07.81 PLEURITIC CHEST PAIN: ICD-10-CM

## 2018-04-03 PROCEDURE — G8399 PT W/DXA RESULTS DOCUMENT: HCPCS | Performed by: INTERNAL MEDICINE

## 2018-04-03 PROCEDURE — G8428 CUR MEDS NOT DOCUMENT: HCPCS | Performed by: INTERNAL MEDICINE

## 2018-04-03 PROCEDURE — G8420 CALC BMI NORM PARAMETERS: HCPCS | Performed by: INTERNAL MEDICINE

## 2018-04-03 PROCEDURE — 4040F PNEUMOC VAC/ADMIN/RCVD: CPT | Performed by: INTERNAL MEDICINE

## 2018-04-03 PROCEDURE — 1036F TOBACCO NON-USER: CPT | Performed by: INTERNAL MEDICINE

## 2018-04-03 PROCEDURE — 99213 OFFICE O/P EST LOW 20 MIN: CPT | Performed by: INTERNAL MEDICINE

## 2018-04-03 PROCEDURE — 1090F PRES/ABSN URINE INCON ASSESS: CPT | Performed by: INTERNAL MEDICINE

## 2018-04-03 PROCEDURE — 1123F ACP DISCUSS/DSCN MKR DOCD: CPT | Performed by: INTERNAL MEDICINE

## 2018-04-03 RX ORDER — LEVOFLOXACIN 750 MG/1
750 TABLET ORAL DAILY
Qty: 5 TABLET | Refills: 0 | Status: SHIPPED | OUTPATIENT
Start: 2018-04-03 | End: 2018-04-08

## 2018-04-03 ASSESSMENT — ENCOUNTER SYMPTOMS
COUGH: 1
SHORTNESS OF BREATH: 1
SORE THROAT: 0

## 2018-04-20 RX ORDER — SIMVASTATIN 20 MG
TABLET ORAL
Qty: 90 TABLET | Refills: 3 | Status: SHIPPED | OUTPATIENT
Start: 2018-04-20 | End: 2019-02-04 | Stop reason: SDUPTHER

## 2018-04-20 RX ORDER — LEVOTHYROXINE SODIUM 0.07 MG/1
TABLET ORAL
Qty: 90 TABLET | Refills: 3 | Status: SHIPPED | OUTPATIENT
Start: 2018-04-20 | End: 2019-02-04 | Stop reason: SDUPTHER

## 2018-06-18 ENCOUNTER — TELEPHONE (OUTPATIENT)
Dept: INTERNAL MEDICINE CLINIC | Age: 78
End: 2018-06-18

## 2018-06-18 RX ORDER — METOPROLOL TARTRATE 50 MG/1
TABLET, FILM COATED ORAL
Qty: 180 TABLET | Refills: 3 | Status: SHIPPED | OUTPATIENT
Start: 2018-06-18 | End: 2019-02-04 | Stop reason: SDUPTHER

## 2018-06-18 RX ORDER — AMLODIPINE BESYLATE 5 MG/1
TABLET ORAL
Qty: 90 TABLET | Refills: 3 | Status: SHIPPED | OUTPATIENT
Start: 2018-06-18 | End: 2019-02-04 | Stop reason: SDUPTHER

## 2018-08-09 ENCOUNTER — OFFICE VISIT (OUTPATIENT)
Dept: INTERNAL MEDICINE CLINIC | Age: 78
End: 2018-08-09

## 2018-08-09 VITALS
WEIGHT: 129 LBS | DIASTOLIC BLOOD PRESSURE: 80 MMHG | HEART RATE: 80 BPM | RESPIRATION RATE: 14 BRPM | BODY MASS INDEX: 20.25 KG/M2 | HEIGHT: 67 IN | SYSTOLIC BLOOD PRESSURE: 130 MMHG

## 2018-08-09 DIAGNOSIS — E03.9 ACQUIRED HYPOTHYROIDISM: ICD-10-CM

## 2018-08-09 DIAGNOSIS — E78.5 HYPERLIPIDEMIA, UNSPECIFIED HYPERLIPIDEMIA TYPE: ICD-10-CM

## 2018-08-09 DIAGNOSIS — M72.0 DUPUYTREN'S CONTRACTURE OF BOTH HANDS: ICD-10-CM

## 2018-08-09 DIAGNOSIS — H34.11 CENTRAL RETINAL ARTERY OCCLUSION OF RIGHT EYE: ICD-10-CM

## 2018-08-09 DIAGNOSIS — I10 BENIGN ESSENTIAL HYPERTENSION: Primary | ICD-10-CM

## 2018-08-09 DIAGNOSIS — M81.0 OSTEOPOROSIS WITHOUT CURRENT PATHOLOGICAL FRACTURE, UNSPECIFIED OSTEOPOROSIS TYPE: ICD-10-CM

## 2018-08-09 LAB
A/G RATIO: 1.6 (ref 1.1–2.2)
ALBUMIN SERPL-MCNC: 4.6 G/DL (ref 3.4–5)
ALP BLD-CCNC: 52 U/L (ref 40–129)
ALT SERPL-CCNC: 17 U/L (ref 10–40)
ANION GAP SERPL CALCULATED.3IONS-SCNC: 12 MMOL/L (ref 3–16)
AST SERPL-CCNC: 24 U/L (ref 15–37)
BASOPHILS ABSOLUTE: 0 K/UL (ref 0–0.2)
BASOPHILS RELATIVE PERCENT: 0.4 %
BILIRUB SERPL-MCNC: 0.4 MG/DL (ref 0–1)
BUN BLDV-MCNC: 12 MG/DL (ref 7–20)
CALCIUM SERPL-MCNC: 10 MG/DL (ref 8.3–10.6)
CHLORIDE BLD-SCNC: 96 MMOL/L (ref 99–110)
CHOLESTEROL, TOTAL: 179 MG/DL (ref 0–199)
CO2: 28 MMOL/L (ref 21–32)
CREAT SERPL-MCNC: 0.9 MG/DL (ref 0.6–1.2)
EOSINOPHILS ABSOLUTE: 0.2 K/UL (ref 0–0.6)
EOSINOPHILS RELATIVE PERCENT: 3.5 %
GFR AFRICAN AMERICAN: >60
GFR NON-AFRICAN AMERICAN: >60
GLOBULIN: 2.8 G/DL
GLUCOSE BLD-MCNC: 93 MG/DL (ref 70–99)
HCT VFR BLD CALC: 39.1 % (ref 36–48)
HDLC SERPL-MCNC: 63 MG/DL (ref 40–60)
HEMOGLOBIN: 13 G/DL (ref 12–16)
LDL CHOLESTEROL CALCULATED: 96 MG/DL
LYMPHOCYTES ABSOLUTE: 1.4 K/UL (ref 1–5.1)
LYMPHOCYTES RELATIVE PERCENT: 21.8 %
MCH RBC QN AUTO: 28.9 PG (ref 26–34)
MCHC RBC AUTO-ENTMCNC: 33.4 G/DL (ref 31–36)
MCV RBC AUTO: 86.4 FL (ref 80–100)
MONOCYTES ABSOLUTE: 0.7 K/UL (ref 0–1.3)
MONOCYTES RELATIVE PERCENT: 10.2 %
NEUTROPHILS ABSOLUTE: 4.2 K/UL (ref 1.7–7.7)
NEUTROPHILS RELATIVE PERCENT: 64.1 %
PDW BLD-RTO: 14.3 % (ref 12.4–15.4)
PLATELET # BLD: 259 K/UL (ref 135–450)
PMV BLD AUTO: 9 FL (ref 5–10.5)
POTASSIUM SERPL-SCNC: 4.7 MMOL/L (ref 3.5–5.1)
RBC # BLD: 4.52 M/UL (ref 4–5.2)
SODIUM BLD-SCNC: 136 MMOL/L (ref 136–145)
TOTAL PROTEIN: 7.4 G/DL (ref 6.4–8.2)
TRIGL SERPL-MCNC: 100 MG/DL (ref 0–150)
TSH SERPL DL<=0.05 MIU/L-ACNC: 0.84 UIU/ML (ref 0.27–4.2)
URIC ACID, SERUM: 4.9 MG/DL (ref 2.6–6)
VLDLC SERPL CALC-MCNC: 20 MG/DL
WBC # BLD: 6.5 K/UL (ref 4–11)

## 2018-08-09 PROCEDURE — G8420 CALC BMI NORM PARAMETERS: HCPCS | Performed by: INTERNAL MEDICINE

## 2018-08-09 PROCEDURE — 1123F ACP DISCUSS/DSCN MKR DOCD: CPT | Performed by: INTERNAL MEDICINE

## 2018-08-09 PROCEDURE — 99214 OFFICE O/P EST MOD 30 MIN: CPT | Performed by: INTERNAL MEDICINE

## 2018-08-09 PROCEDURE — 1090F PRES/ABSN URINE INCON ASSESS: CPT | Performed by: INTERNAL MEDICINE

## 2018-08-09 PROCEDURE — G8427 DOCREV CUR MEDS BY ELIG CLIN: HCPCS | Performed by: INTERNAL MEDICINE

## 2018-08-09 PROCEDURE — 4040F PNEUMOC VAC/ADMIN/RCVD: CPT | Performed by: INTERNAL MEDICINE

## 2018-08-09 PROCEDURE — G8399 PT W/DXA RESULTS DOCUMENT: HCPCS | Performed by: INTERNAL MEDICINE

## 2018-08-09 PROCEDURE — 1036F TOBACCO NON-USER: CPT | Performed by: INTERNAL MEDICINE

## 2018-08-09 PROCEDURE — 1101F PT FALLS ASSESS-DOCD LE1/YR: CPT | Performed by: INTERNAL MEDICINE

## 2018-08-09 ASSESSMENT — ENCOUNTER SYMPTOMS
SHORTNESS OF BREATH: 0
VOMITING: 0
ABDOMINAL PAIN: 0
NAUSEA: 0
WHEEZING: 0
RHINORRHEA: 0

## 2018-08-09 ASSESSMENT — PATIENT HEALTH QUESTIONNAIRE - PHQ9
SUM OF ALL RESPONSES TO PHQ QUESTIONS 1-9: 0
2. FEELING DOWN, DEPRESSED OR HOPELESS: 0
1. LITTLE INTEREST OR PLEASURE IN DOING THINGS: 0
SUM OF ALL RESPONSES TO PHQ QUESTIONS 1-9: 0
SUM OF ALL RESPONSES TO PHQ9 QUESTIONS 1 & 2: 0

## 2018-08-09 NOTE — PROGRESS NOTES
Subjective:      Patient ID: Jack Crisostomo is a 76 y.o. female. HPI  Patient is here for follow up of hypertension, hyperlipidemia, hypothyroidism, central retinal artery occlusion and osteoporosis. Patient's BP is controlled on current medications. No CP or dyspnea. Her cholesterol is at goal. No myalgias. It needs checked. Patient's hypothyroidism is stable on replacement therapy. No diarrhea or constipation. Her vision is unchanged. 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 She has osteoporosis. She does not want to do Prolia. She is taking Calcium. No fracture issues. Review of Systems   Constitutional: Negative for appetite change and fatigue. HENT: Negative for postnasal drip and rhinorrhea. Respiratory: Negative for shortness of breath and wheezing. Cardiovascular: Negative for chest pain and palpitations. Gastrointestinal: Negative for abdominal pain, nausea and vomiting. Endocrine: Negative for cold intolerance, heat intolerance and polyphagia. Musculoskeletal: Negative for joint swelling. Skin: Negative for rash. Neurological: Negative for light-headedness. Psychiatric/Behavioral: Negative for sleep disturbance. Current Outpatient Prescriptions   Medication Sig Dispense Refill    metoprolol tartrate (LOPRESSOR) 50 MG tablet TAKE 1 TABLET TWICE DAILY 180 tablet 3    amLODIPine (NORVASC) 5 MG tablet TAKE 1 TABLET EVERY DAY 90 tablet 3    simvastatin (ZOCOR) 20 MG tablet TAKE 1 TABLET EVERY EVENING 90 tablet 3    levothyroxine (SYNTHROID) 75 MCG tablet TAKE 1 TABLET EVERY DAY 90 tablet 3    calcium carbonate (OYSTER SHELL CALCIUM 500 MG) 1250 (500 Ca) MG tablet Take 1 tablet by mouth daily      vitamin D 1000 units CAPS Take by mouth      NONFORMULARY Indications: Chloracedin      Multiple Vitamin (MULTI-VITAMIN) TABS Take  by mouth.  aspirin 81 MG EC tablet Take 81 mg by mouth daily. No current facility-administered medications for this visit. contracture of both hands            Plan:         HTN: on Metoprolol and norvasc. Hypothyroidism:  Patient's hypothyroidism is stable on replacement therapy. Check TSH. Hyperlipidemia:  At goal. On Zocor. 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 Osteoporosis-  On calcium with vitamin D. Refuses other treatment. Oliver Zuniga received counseling on the following healthy behaviors: nutrition and exercise  Reviewed prior labs and health maintenance  Continue current medications, diet and exercise. Discussed use, benefit, and side effects of prescribed medications. Barriers to medication compliance addressed. Patient given educational materials - see patient instructions  Was a self-tracking handout given in paper form or via [employer]? Yes    Requested Prescriptions      No prescriptions requested or ordered in this encounter       All patient questions answered. Patient voiced understanding. Quality Measures    Body mass index is 20.2 kg/m². Low. Weight control planned discussed Healthy diet and regular exercise. BP: 130/80. Blood pressure is normal. Treatment plan consists of No treatment change needed. Fall Risk 8/9/2018 3/10/2017 12/1/2015 10/27/2014   2 or more falls in past year? no no no no   Fall with injury in past year? no no no no     The patient does not have a history of falls. I did , complete a risk assessment for falls. A plan of care for falls No Treatment plan indicated    Lab Results   Component Value Date    LDLCALC 107 (H) 08/09/2017    (goal LDL reduction with dx if diabetes is 50% LDL reduction)    PHQ Scores 8/9/2018 3/10/2017 5/6/2015 3/14/2014   PHQ2 Score 0 0 0 0   PHQ9 Score 0 0 0 0     Interpretation of Total Score Depression Severity: 1-4 = Minimal depression, 5-9 = Mild depression, 10-14 = Moderate depression, 15-19 = Moderately severe depression, 20-27 = Severe depression       Continue exercise. Decrease calorie intake.   The current medical regimen is effective; continue present plan and medications. See orders.

## 2018-10-09 ENCOUNTER — APPOINTMENT (OUTPATIENT)
Dept: CT IMAGING | Age: 78
End: 2018-10-09
Payer: MEDICARE

## 2018-10-09 ENCOUNTER — HOSPITAL ENCOUNTER (EMERGENCY)
Age: 78
Discharge: HOME | End: 2018-10-09
Payer: MEDICARE

## 2018-10-09 VITALS
DIASTOLIC BLOOD PRESSURE: 73 MMHG | WEIGHT: 130 LBS | BODY MASS INDEX: 20.4 KG/M2 | HEIGHT: 67 IN | SYSTOLIC BLOOD PRESSURE: 171 MMHG | TEMPERATURE: 98 F | HEART RATE: 67 BPM | OXYGEN SATURATION: 100 % | RESPIRATION RATE: 14 BRPM

## 2018-10-09 DIAGNOSIS — S00.83XA FACIAL CONTUSION, INITIAL ENCOUNTER: Primary | ICD-10-CM

## 2018-10-09 PROCEDURE — 70486 CT MAXILLOFACIAL W/O DYE: CPT

## 2018-10-09 PROCEDURE — 70450 CT HEAD/BRAIN W/O DYE: CPT

## 2018-10-09 PROCEDURE — 99283 EMERGENCY DEPT VISIT LOW MDM: CPT

## 2018-10-09 ASSESSMENT — ENCOUNTER SYMPTOMS
TRISMUS: 0
FACIAL SWELLING: 1
EYE PAIN: 0
ABDOMINAL PAIN: 0
SHORTNESS OF BREATH: 0
BACK PAIN: 0
VOMITING: 0
DOUBLE VISION: 0

## 2018-10-09 NOTE — ED PROVIDER NOTES
erythematous and not bulging. No hemotympanum. Nose: Nose normal.   Mouth/Throat: Uvula is midline, oropharynx is clear and moist and mucous membranes are normal. No oropharyngeal exudate, posterior oropharyngeal edema or posterior oropharyngeal erythema. Eyes: Pupils are equal, round, and reactive to light. Conjunctivae and EOM are normal. Right conjunctiva has no hemorrhage. Fundoscopic exam:       The right eye shows no hemorrhage and no papilledema. Neck: Normal range of motion. Neck supple. No JVD present. Cardiovascular: Normal rate, regular rhythm and intact distal pulses. Pulmonary/Chest: Effort normal and breath sounds normal. No respiratory distress. She has no wheezes. She has no rales. Abdominal: Soft. Bowel sounds are normal. She exhibits no distension. There is no tenderness. There is no rigidity, no rebound and no guarding. Musculoskeletal: Normal range of motion. She exhibits no edema. Cervical back: She exhibits normal range of motion, no tenderness and no bony tenderness. Neurological: She is alert and oriented to person, place, and time. She has normal strength. No cranial nerve deficit or sensory deficit. She exhibits normal muscle tone. Coordination normal. GCS eye subscore is 4. GCS verbal subscore is 5. GCS motor subscore is 6. Skin: Skin is warm and dry. No rash noted. Psychiatric: She has a normal mood and affect. Her behavior is normal.   Vitals reviewed.       Procedures    MDM  Number of Diagnoses or Management Options  Facial contusion, initial encounter:      Amount and/or Complexity of Data Reviewed  Tests in the radiology section of CPT®: ordered and reviewed    Patient Progress  Patient progress: stable        Ct Head Wo Contrast    Result Date: 10/9/2018  EXAMINATION: CT OF THE HEAD WITHOUT CONTRAST; CT OF THE FACE WITHOUT CONTRAST  10/9/2018 7:30 pm TECHNIQUE: CT of the head was performed without the administration of intravenous contrast. Dose modulation, iterative reconstruction, and/or weight based adjustment of the mA/kV was utilized to reduce the radiation dose to as low as reasonably achievable.; CT of the face was performed without the administration of intravenous contrast. Multiplanar reformatted images are provided for review. Dose modulation, iterative reconstruction, and/or weight based adjustment of the mA/kV was utilized to reduce the radiation dose to as low as reasonably achievable. COMPARISON: None. HISTORY: ORDERING SYSTEM PROVIDED HISTORY: HEAD INJURY MILD OR MODERATE ACUTE, NO NEUROLOGICAL DEFICIT TECHNOLOGIST PROVIDED HISTORY: Has a \"code stroke\" or \"stroke alert\" been called? ->No Ordering Physician Provided Reason for Exam: fall, hit rt side of face, swelling and bruising over rt cheek and eye area Acuity: Acute Type of Exam: Initial Mechanism of Injury: fall, hit rt side of face, swelling and bruising over rt cheek and eye area; ORDERING SYSTEM PROVIDED HISTORY: FACIAL FRACTURE(S) TECHNOLOGIST PROVIDED HISTORY: Ordering Physician Provided Reason for Exam: fall, hit rt side of face, swelling and bruising over rt cheek and eye area Acuity: Acute Type of Exam: Initial Mechanism of Injury: fall, hit rt side of face, swelling and bruising over rt cheek and eye area FINDINGS: CT HEAD: BRAIN/VENTRICLES: There is no acute intracranial hemorrhage, mass effect or midline shift. No abnormal extra-axial fluid collection. The gray-white differentiation is maintained without evidence of an acute infarct. There is no evidence of hydrocephalus. ORBITS: The visualized portion of the orbits demonstrate no acute abnormality. SINUSES: The visualized paranasal sinuses and mastoid air cells demonstrate no acute abnormality. SOFT TISSUES/SKULL:  No acute abnormality of the visualized skull or soft tissues. CT FACIAL BONES: FACIAL BONES: The patient is edentulous. The maxilla, pterygoid plates and zygomatic arches are intact. The mandible is intact.   The mandibular condyles are normally situated. The nasal bones and maxillary nasal processes are intact. ORBITS: The globes appear intact. The extraocular muscles, optic nerve sheath complexes and lacrimal glands appear unremarkable. No retrobulbar hematoma or mass is seen. The orbital walls and rims are intact. SINUSES/MASTOIDS: The paranasal sinuses and mastoid air cells are well aerated. No acute fracture is seen. SOFT TISSUES: No appreciable facial soft tissue swelling is seen. No acute intracranial abnormality. White matter hypoattenuation described is typical of microvascular ischemic disease or as sequela of dysmyelinating/demyelinating processes. No acute traumatic injury of the facial bones. Ct Facial Bones Wo Contrast    Result Date: 10/9/2018  EXAMINATION: CT OF THE HEAD WITHOUT CONTRAST; CT OF THE FACE WITHOUT CONTRAST  10/9/2018 7:30 pm TECHNIQUE: CT of the head was performed without the administration of intravenous contrast. Dose modulation, iterative reconstruction, and/or weight based adjustment of the mA/kV was utilized to reduce the radiation dose to as low as reasonably achievable.; CT of the face was performed without the administration of intravenous contrast. Multiplanar reformatted images are provided for review. Dose modulation, iterative reconstruction, and/or weight based adjustment of the mA/kV was utilized to reduce the radiation dose to as low as reasonably achievable. COMPARISON: None. HISTORY: ORDERING SYSTEM PROVIDED HISTORY: HEAD INJURY MILD OR MODERATE ACUTE, NO NEUROLOGICAL DEFICIT TECHNOLOGIST PROVIDED HISTORY: Has a \"code stroke\" or \"stroke alert\" been called? ->No Ordering Physician Provided Reason for Exam: fall, hit rt side of face, swelling and bruising over rt cheek and eye area Acuity: Acute Type of Exam: Initial Mechanism of Injury: fall, hit rt side of face, swelling and bruising over rt cheek and eye area; ORDERING SYSTEM PROVIDED HISTORY: FACIAL FRACTURE(S)

## 2019-02-04 ENCOUNTER — OFFICE VISIT (OUTPATIENT)
Dept: INTERNAL MEDICINE CLINIC | Age: 79
End: 2019-02-04

## 2019-02-04 ENCOUNTER — HOSPITAL ENCOUNTER (OUTPATIENT)
Age: 79
Discharge: HOME | End: 2019-02-04
Payer: COMMERCIAL

## 2019-02-04 VITALS
SYSTOLIC BLOOD PRESSURE: 120 MMHG | WEIGHT: 135 LBS | HEART RATE: 70 BPM | RESPIRATION RATE: 14 BRPM | HEIGHT: 67 IN | BODY MASS INDEX: 21.19 KG/M2 | DIASTOLIC BLOOD PRESSURE: 70 MMHG

## 2019-02-04 DIAGNOSIS — E78.5 HYPERLIPIDEMIA, UNSPECIFIED HYPERLIPIDEMIA TYPE: ICD-10-CM

## 2019-02-04 DIAGNOSIS — E03.9 ACQUIRED HYPOTHYROIDISM: ICD-10-CM

## 2019-02-04 DIAGNOSIS — Z00.00 ROUTINE GENERAL MEDICAL EXAMINATION AT A HEALTH CARE FACILITY: ICD-10-CM

## 2019-02-04 DIAGNOSIS — Z00.00 ENCOUNTER FOR INITIAL PREVENTIVE PHYSICAL EXAMINATION COVERED BY MEDICARE: Primary | ICD-10-CM

## 2019-02-04 LAB
A/G RATIO: 1.4 (ref 1.1–2.2)
ALBUMIN SERPL-MCNC: 4.4 G/DL (ref 3.4–5)
ALP BLD-CCNC: 49 U/L (ref 40–129)
ALT SERPL-CCNC: 12 U/L (ref 10–40)
ANION GAP SERPL CALCULATED.3IONS-SCNC: 10 MMOL/L (ref 3–16)
AST SERPL-CCNC: 19 U/L (ref 15–37)
BASOPHILS ABSOLUTE: 0 K/UL (ref 0–0.2)
BASOPHILS RELATIVE PERCENT: 0.6 %
BILIRUB SERPL-MCNC: 0.3 MG/DL (ref 0–1)
BUN BLDV-MCNC: 13 MG/DL (ref 7–20)
CALCIUM SERPL-MCNC: 9.8 MG/DL (ref 8.3–10.6)
CHLORIDE BLD-SCNC: 100 MMOL/L (ref 99–110)
CO2: 27 MMOL/L (ref 21–32)
CREAT SERPL-MCNC: 0.8 MG/DL (ref 0.6–1.2)
EOSINOPHILS ABSOLUTE: 0.1 K/UL (ref 0–0.6)
EOSINOPHILS RELATIVE PERCENT: 2.1 %
GFR AFRICAN AMERICAN: >60
GFR NON-AFRICAN AMERICAN: >60
GLOBULIN: 3.2 G/DL
GLUCOSE BLD-MCNC: 88 MG/DL (ref 70–99)
HCT VFR BLD CALC: 38.2 % (ref 36–48)
HEMOGLOBIN: 12.6 G/DL (ref 12–16)
LYMPHOCYTES ABSOLUTE: 1.3 K/UL (ref 1–5.1)
LYMPHOCYTES RELATIVE PERCENT: 24.6 %
MCH RBC QN AUTO: 28.2 PG (ref 26–34)
MCHC RBC AUTO-ENTMCNC: 32.9 G/DL (ref 31–36)
MCV RBC AUTO: 85.7 FL (ref 80–100)
MONOCYTES ABSOLUTE: 0.6 K/UL (ref 0–1.3)
MONOCYTES RELATIVE PERCENT: 11.3 %
NEUTROPHILS ABSOLUTE: 3.2 K/UL (ref 1.7–7.7)
NEUTROPHILS RELATIVE PERCENT: 61.4 %
PDW BLD-RTO: 14.3 % (ref 12.4–15.4)
PLATELET # BLD: 278 K/UL (ref 135–450)
PMV BLD AUTO: 9.2 FL (ref 5–10.5)
POTASSIUM SERPL-SCNC: 4.8 MMOL/L (ref 3.5–5.1)
RBC # BLD: 4.46 M/UL (ref 4–5.2)
SODIUM BLD-SCNC: 137 MMOL/L (ref 136–145)
TOTAL PROTEIN: 7.6 G/DL (ref 6.4–8.2)
TSH SERPL DL<=0.05 MIU/L-ACNC: 0.63 UIU/ML (ref 0.27–4.2)
WBC # BLD: 5.2 K/UL (ref 4–11)

## 2019-02-04 PROCEDURE — 36415 COLL VENOUS BLD VENIPUNCTURE: CPT

## 2019-02-04 PROCEDURE — 85025 COMPLETE CBC W/AUTO DIFF WBC: CPT

## 2019-02-04 PROCEDURE — 84443 ASSAY THYROID STIM HORMONE: CPT

## 2019-02-04 PROCEDURE — G0438 PPPS, INITIAL VISIT: HCPCS | Performed by: INTERNAL MEDICINE

## 2019-02-04 PROCEDURE — 80053 COMPREHEN METABOLIC PANEL: CPT

## 2019-02-04 RX ORDER — METOPROLOL TARTRATE 50 MG/1
TABLET, FILM COATED ORAL
Qty: 180 TABLET | Refills: 3 | Status: SHIPPED | OUTPATIENT
Start: 2019-02-04 | End: 2019-12-27

## 2019-02-04 RX ORDER — LEVOTHYROXINE SODIUM 0.07 MG/1
TABLET ORAL
Qty: 90 TABLET | Refills: 3 | Status: SHIPPED | OUTPATIENT
Start: 2019-02-04 | End: 2020-01-07

## 2019-02-04 RX ORDER — AMLODIPINE BESYLATE 5 MG/1
TABLET ORAL
Qty: 90 TABLET | Refills: 3 | Status: SHIPPED | OUTPATIENT
Start: 2019-02-04 | End: 2020-01-07

## 2019-02-04 RX ORDER — SIMVASTATIN 20 MG
TABLET ORAL
Qty: 90 TABLET | Refills: 3 | Status: SHIPPED | OUTPATIENT
Start: 2019-02-04 | End: 2020-01-07

## 2019-02-04 ASSESSMENT — LIFESTYLE VARIABLES: HOW OFTEN DO YOU HAVE A DRINK CONTAINING ALCOHOL: 0

## 2019-02-04 ASSESSMENT — ANXIETY QUESTIONNAIRES: GAD7 TOTAL SCORE: 0

## 2019-02-04 ASSESSMENT — PATIENT HEALTH QUESTIONNAIRE - PHQ9
SUM OF ALL RESPONSES TO PHQ QUESTIONS 1-9: 0
SUM OF ALL RESPONSES TO PHQ QUESTIONS 1-9: 0

## 2019-12-27 RX ORDER — METOPROLOL TARTRATE 50 MG/1
TABLET, FILM COATED ORAL
Qty: 180 TABLET | Refills: 3 | Status: SHIPPED | OUTPATIENT
Start: 2019-12-27 | End: 2020-11-23 | Stop reason: SDUPTHER

## 2020-01-07 RX ORDER — AMLODIPINE BESYLATE 5 MG/1
TABLET ORAL
Qty: 90 TABLET | Refills: 0 | Status: SHIPPED | OUTPATIENT
Start: 2020-01-07 | End: 2020-02-21 | Stop reason: SDUPTHER

## 2020-01-07 RX ORDER — SIMVASTATIN 20 MG
TABLET ORAL
Qty: 90 TABLET | Refills: 0 | Status: SHIPPED | OUTPATIENT
Start: 2020-01-07 | End: 2020-02-21 | Stop reason: SDUPTHER

## 2020-01-07 RX ORDER — LEVOTHYROXINE SODIUM 0.07 MG/1
TABLET ORAL
Qty: 90 TABLET | Refills: 0 | Status: SHIPPED | OUTPATIENT
Start: 2020-01-07 | End: 2020-02-21 | Stop reason: SDUPTHER

## 2020-02-21 ENCOUNTER — OFFICE VISIT (OUTPATIENT)
Dept: INTERNAL MEDICINE CLINIC | Age: 80
End: 2020-02-21

## 2020-02-21 VITALS
RESPIRATION RATE: 14 BRPM | SYSTOLIC BLOOD PRESSURE: 130 MMHG | DIASTOLIC BLOOD PRESSURE: 70 MMHG | WEIGHT: 134 LBS | HEIGHT: 67 IN | HEART RATE: 78 BPM | BODY MASS INDEX: 21.03 KG/M2

## 2020-02-21 DIAGNOSIS — I10 BENIGN ESSENTIAL HYPERTENSION: ICD-10-CM

## 2020-02-21 DIAGNOSIS — E03.9 ACQUIRED HYPOTHYROIDISM: ICD-10-CM

## 2020-02-21 LAB
A/G RATIO: 1.7 (ref 1.1–2.2)
ALBUMIN SERPL-MCNC: 4.8 G/DL (ref 3.4–5)
ALP BLD-CCNC: 58 U/L (ref 40–129)
ALT SERPL-CCNC: 13 U/L (ref 10–40)
ANION GAP SERPL CALCULATED.3IONS-SCNC: 12 MMOL/L (ref 3–16)
AST SERPL-CCNC: 22 U/L (ref 15–37)
BASOPHILS ABSOLUTE: 0.1 K/UL (ref 0–0.2)
BASOPHILS RELATIVE PERCENT: 0.7 %
BILIRUB SERPL-MCNC: 0.3 MG/DL (ref 0–1)
BUN BLDV-MCNC: 13 MG/DL (ref 7–20)
CALCIUM SERPL-MCNC: 10.6 MG/DL (ref 8.3–10.6)
CHLORIDE BLD-SCNC: 96 MMOL/L (ref 99–110)
CHOLESTEROL, TOTAL: 188 MG/DL (ref 0–199)
CO2: 29 MMOL/L (ref 21–32)
CREAT SERPL-MCNC: 1 MG/DL (ref 0.6–1.2)
EOSINOPHILS ABSOLUTE: 0.2 K/UL (ref 0–0.6)
EOSINOPHILS RELATIVE PERCENT: 2.8 %
GFR AFRICAN AMERICAN: >60
GFR NON-AFRICAN AMERICAN: 53
GLOBULIN: 2.8 G/DL
GLUCOSE BLD-MCNC: 92 MG/DL (ref 70–99)
HCT VFR BLD CALC: 40.8 % (ref 36–48)
HDLC SERPL-MCNC: 68 MG/DL (ref 40–60)
HEMOGLOBIN: 13.7 G/DL (ref 12–16)
LDL CHOLESTEROL CALCULATED: 92 MG/DL
LYMPHOCYTES ABSOLUTE: 1.2 K/UL (ref 1–5.1)
LYMPHOCYTES RELATIVE PERCENT: 14.6 %
MCH RBC QN AUTO: 29.5 PG (ref 26–34)
MCHC RBC AUTO-ENTMCNC: 33.6 G/DL (ref 31–36)
MCV RBC AUTO: 87.8 FL (ref 80–100)
MONOCYTES ABSOLUTE: 1.1 K/UL (ref 0–1.3)
MONOCYTES RELATIVE PERCENT: 13.3 %
NEUTROPHILS ABSOLUTE: 5.4 K/UL (ref 1.7–7.7)
NEUTROPHILS RELATIVE PERCENT: 68.6 %
PDW BLD-RTO: 14.3 % (ref 12.4–15.4)
PLATELET # BLD: 280 K/UL (ref 135–450)
PMV BLD AUTO: 9.2 FL (ref 5–10.5)
POTASSIUM SERPL-SCNC: 5.2 MMOL/L (ref 3.5–5.1)
RBC # BLD: 4.65 M/UL (ref 4–5.2)
SODIUM BLD-SCNC: 137 MMOL/L (ref 136–145)
TOTAL PROTEIN: 7.6 G/DL (ref 6.4–8.2)
TRIGL SERPL-MCNC: 141 MG/DL (ref 0–150)
TSH SERPL DL<=0.05 MIU/L-ACNC: 2.06 UIU/ML (ref 0.27–4.2)
URIC ACID, SERUM: 4.9 MG/DL (ref 2.6–6)
VLDLC SERPL CALC-MCNC: 28 MG/DL
WBC # BLD: 7.9 K/UL (ref 4–11)

## 2020-02-21 PROCEDURE — 99214 OFFICE O/P EST MOD 30 MIN: CPT | Performed by: INTERNAL MEDICINE

## 2020-02-21 RX ORDER — SIMVASTATIN 20 MG
TABLET ORAL
Qty: 90 TABLET | Refills: 0 | Status: SHIPPED | OUTPATIENT
Start: 2020-02-21 | End: 2020-08-21 | Stop reason: SDUPTHER

## 2020-02-21 RX ORDER — LEVOTHYROXINE SODIUM 0.07 MG/1
TABLET ORAL
Qty: 90 TABLET | Refills: 0 | Status: SHIPPED | OUTPATIENT
Start: 2020-02-21 | End: 2020-08-21 | Stop reason: SDUPTHER

## 2020-02-21 RX ORDER — AMLODIPINE BESYLATE 5 MG/1
TABLET ORAL
Qty: 90 TABLET | Refills: 0 | Status: SHIPPED | OUTPATIENT
Start: 2020-02-21 | End: 2020-08-21 | Stop reason: SDUPTHER

## 2020-02-21 ASSESSMENT — PATIENT HEALTH QUESTIONNAIRE - PHQ9
SUM OF ALL RESPONSES TO PHQ9 QUESTIONS 1 & 2: 0
1. LITTLE INTEREST OR PLEASURE IN DOING THINGS: 0
SUM OF ALL RESPONSES TO PHQ QUESTIONS 1-9: 0
2. FEELING DOWN, DEPRESSED OR HOPELESS: 0
SUM OF ALL RESPONSES TO PHQ QUESTIONS 1-9: 0

## 2020-02-21 ASSESSMENT — ENCOUNTER SYMPTOMS
VOMITING: 0
RHINORRHEA: 0
WHEEZING: 0
SHORTNESS OF BREATH: 0
NAUSEA: 0
ABDOMINAL PAIN: 0

## 2020-02-21 NOTE — PROGRESS NOTES
Subjective:      Patient ID: Lelia Benedict is a 78 y.o. female. HPI  Patient is here for follow up of hypertension, hyperlipidemia, hypothyroidism, central retinal artery occlusion and osteoporosis. Patient's BP is controlled on current medications. No CP or dyspnea. Her cholesterol is at goal. No myalgias. It needs checked. Patient's hypothyroidism is stable on replacement therapy. No diarrhea or constipation. She has hemorrhoids that are acting up. Her vision is unchanged. 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 She has osteoporosis. She is taking Calcium. No fracture issues. Is refusing any other treatment. She takes Miralax off and on. Review of Systems   Constitutional: Negative for appetite change and fatigue. HENT: Negative for postnasal drip and rhinorrhea. Respiratory: Negative for shortness of breath and wheezing. Cardiovascular: Negative for chest pain and palpitations. Gastrointestinal: Negative for abdominal pain, nausea and vomiting. Endocrine: Negative for cold intolerance, heat intolerance and polyphagia. Musculoskeletal: Negative for joint swelling. Skin: Negative for rash. Neurological: Negative for light-headedness. Psychiatric/Behavioral: Negative for sleep disturbance. Current Outpatient Medications   Medication Sig Dispense Refill    simvastatin (ZOCOR) 20 MG tablet TAKE 1 TABLET EVERY EVENING 90 tablet 0    amLODIPine (NORVASC) 5 MG tablet TAKE 1 TABLET BY MOUTH EVERY DAY 90 tablet 0    levothyroxine (SYNTHROID) 75 MCG tablet TAKE 1 TABLET BY MOUTH EVERY DAY 90 tablet 0    metoprolol tartrate (LOPRESSOR) 50 MG tablet TAKE 1 TABLET TWICE DAILY 180 tablet 3    calcium carbonate (OYSTER SHELL CALCIUM 500 MG) 1250 (500 Ca) MG tablet Take 1 tablet by mouth daily      vitamin D 1000 units CAPS Take by mouth      NONFORMULARY Indications: Chloracedin      Multiple Vitamin (MULTI-VITAMIN) TABS Take  by mouth.         aspirin 81 MG EC tablet Take 81 mg by mouth daily. No current facility-administered medications for this visit. There are no changes to past medical history, family history, social history or review of systems(except as noted in the history section) since prior note (all reviewed with patient). /70 (Site: Right Upper Arm, Position: Sitting, Cuff Size: Medium Adult)   Pulse 78   Resp 14   Ht 5' 7\" (1.702 m)   Wt 134 lb (60.8 kg)   BMI 20.99 kg/m²        Objective:   Physical Exam   Constitutional: She is oriented to person, place, and time. She appears well-developed and well-nourished. HENT:   Head: Normocephalic. Eyes: Pupils are equal, round, and reactive to light. Conjunctivae and EOM are normal.   Neck: Trachea normal and normal range of motion. Neck supple. No JVD present. Carotid bruit is not present. No thyroid mass and no thyromegaly present. Cardiovascular: Normal rate, regular rhythm and normal heart sounds. Exam reveals no gallop. No murmur heard. Pulmonary/Chest: Effort normal and breath sounds normal. No respiratory distress. She has no wheezes. She has no rales. Abdominal: Soft. Bowel sounds are normal. She exhibits no distension and no mass. There is no splenomegaly or hepatomegaly. There is no abdominal tenderness. Musculoskeletal:         General: No edema. Comments: Dupuytren's contracture both hands   Lymphadenopathy:     She has no cervical adenopathy. Neurological: She is alert and oriented to person, place, and time. Skin: Skin is warm and dry. No rash noted. Psychiatric: She has a normal mood and affect. Her behavior is normal. Judgment and thought content normal.       Assessment:       Diagnosis Orders   1. Benign essential hypertension  Comprehensive Metabolic Panel    CBC Auto Differential    Lipid Panel    Uric Acid   2. Acquired hypothyroidism  TSH without Reflex   3. Hyperlipidemia, unspecified hyperlipidemia type     4.  Central retinal artery occlusion of right eye with dx if diabetes is 50% LDL reduction)    PHQ Scores 2/4/2019 8/9/2018 3/10/2017 5/6/2015 3/14/2014   PHQ2 Score 0 0 0 0 0   PHQ9 Score 0 0 0 0 0     Interpretation of Total Score Depression Severity: 1-4 = Minimal depression, 5-9 = Mild depression, 10-14 = Moderate depression, 15-19 = Moderately severe depression, 20-27 = Severe depression       Continue exercise. Decrease calorie intake. The current medical regimen is effective;  continue present plan and medications. See orders.

## 2020-02-21 NOTE — PROGRESS NOTES
Visit Information    Have you changed or started any medications since your last visit including any over-the-counter medicines, vitamins, or herbal medicines? no   Are you having any side effects from any of your medications? -  no  Have you stopped taking any of your medications? Is so, why? -  no    Have you seen any other physician or provider since your last visit? No  Have you had any other diagnostic tests since your last visit? No  Have you been seen in the emergency room and/or had an admission to a hospital since we last saw you? No  Have you had your routine dental cleaning in the past 6 months? No N/A    Have you activated your [employer] account? If not, what are your barriers?  No:      Patient Care Team:  Iraida Gambino MD as PCP - Major Dougherty MD as PCP - [facility] Provider  Donato Zamora MD as Surgeon (Orthopedic Surgery)    Medical History Review  Past Medical, Family, and Social History reviewed and does not contribute to the patient presenting condition    Health Maintenance   Topic Date Due    DTaP/Tdap/Td vaccine (1 - Tdap) 10/04/1951    Shingles Vaccine (2 of 3) 01/07/2015    Annual Wellness Visit (AWV)  05/29/2019    Lipid screen  08/09/2019    Flu vaccine (1) 09/01/2019    TSH testing  02/04/2020    Potassium monitoring  02/04/2020    Creatinine monitoring  02/04/2020    DEXA (modify frequency per FRAX score)  Completed    Pneumococcal 65+ years Vaccine  Completed    Hepatitis A vaccine  Aged Out    Hepatitis B vaccine  Aged Out    Hib vaccine  Aged Out    Meningococcal (ACWY) vaccine  Aged Out

## 2020-06-30 ENCOUNTER — HOSPITAL ENCOUNTER (OUTPATIENT)
Age: 80
Discharge: HOME | End: 2020-06-30
Payer: COMMERCIAL

## 2020-06-30 ENCOUNTER — HOSPITAL ENCOUNTER (OUTPATIENT)
Dept: GENERAL RADIOLOGY | Age: 80
Discharge: HOME | End: 2020-06-30
Payer: COMMERCIAL

## 2020-06-30 PROCEDURE — 74018 RADEX ABDOMEN 1 VIEW: CPT

## 2020-07-02 ENCOUNTER — HOSPITAL ENCOUNTER (OUTPATIENT)
Age: 80
Discharge: HOME | End: 2020-07-02
Payer: COMMERCIAL

## 2020-07-02 LAB — SARS-COV-2, NAAT: NOT DETECTED

## 2020-07-02 PROCEDURE — U0002 COVID-19 LAB TEST NON-CDC: HCPCS

## 2020-07-02 NOTE — PROGRESS NOTES
Message left for pt in regards to COVID-19 testing and result requirements before procedure date of 7/8/2020. Instructed pt on time frame and testing needs to be completed by 7/4/2020. Physician office number provided for pt. if they are unable to make it to clinic for testing or if they have any issues or questions regarding testing.

## 2020-07-06 ENCOUNTER — ANESTHESIA EVENT (OUTPATIENT)
Dept: ENDOSCOPY | Age: 80
End: 2020-07-06
Payer: COMMERCIAL

## 2020-07-06 NOTE — ANESTHESIA PRE PROCEDURE
Department of Anesthesiology  Preprocedure Note       Name:  Grace Lopez   Age:  78 y.o.  :  1940                                          MRN:  [id number]         Date:  2020      Surgeon: Desirae Telles):  Alana Zhu MD    Procedure: Procedure(s):  COLON W/ANES. (10:00)    Medications prior to admission:   Prior to Admission medications    Medication Sig Start Date End Date Taking? Authorizing Provider   simvastatin (ZOCOR) 20 MG tablet TAKE 1 TABLET EVERY EVENING 20   Ed Berumen MD   amLODIPine (NORVASC) 5 MG tablet TAKE 1 TABLET BY MOUTH EVERY DAY 20   Ed Berumen MD   levothyroxine (SYNTHROID) 75 MCG tablet TAKE 1 TABLET BY MOUTH EVERY DAY 20   Ed Berumen MD   metoprolol tartrate (LOPRESSOR) 50 MG tablet TAKE 1 TABLET TWICE DAILY 19   Ed Berumen MD   calcium carbonate (OYSTER SHELL CALCIUM 500 MG) 1250 (500 Ca) MG tablet Take 1 tablet by mouth daily    Historical Provider, MD   vitamin D 1000 units CAPS Take by mouth    Historical Provider, MD   NONFORMULARY Indications: Chloracedin    Historical Provider, MD   Multiple Vitamin (MULTI-VITAMIN) TABS Take  by mouth. Historical Provider, MD   aspirin 81 MG EC tablet Take 81 mg by mouth daily. Historical Provider, MD       Current medications:    No current facility-administered medications for this encounter.       Current Outpatient Medications   Medication Sig Dispense Refill    simvastatin (ZOCOR) 20 MG tablet TAKE 1 TABLET EVERY EVENING 90 tablet 0    amLODIPine (NORVASC) 5 MG tablet TAKE 1 TABLET BY MOUTH EVERY DAY 90 tablet 0    levothyroxine (SYNTHROID) 75 MCG tablet TAKE 1 TABLET BY MOUTH EVERY DAY 90 tablet 0    metoprolol tartrate (LOPRESSOR) 50 MG tablet TAKE 1 TABLET TWICE DAILY 180 tablet 3    calcium carbonate (OYSTER SHELL CALCIUM 500 MG) 1250 (500 Ca) MG tablet Take 1 tablet by mouth daily      vitamin D 1000 units CAPS Take by mouth      NONFORMULARY Indications: Chloracedin      Multiple Vitamin (MULTI-VITAMIN) TABS Take  by mouth.  aspirin 81 MG EC tablet Take 81 mg by mouth daily. Allergies: Allergies   Allergen Reactions    Latex      Skin irritation       Problem List:    Patient Active Problem List   Diagnosis Code    Central retinal artery occlusion H34.10    Hyperlipidemia E78.5    Hypothyroidism E03.9    Trigger finger, acquired M65.30    Other musculoskeletal symptoms referable to limbs(729.89) R29.898    Dupuytren's contracture of both hands M72.0    Osteoporosis without current pathological fracture M81.0    Benign essential hypertension I10       Past Medical History:        Diagnosis Date    Benign essential hypertension 11/15/2017    Central retinal artery occlusion     Dupuytren's contracture of both hands 3/14/2014    Hyperlipidemia     Hypothyroidism     Mitral valve disease     Osteoporosis     Osteoporosis without current pathological fracture 8/9/2017    Other musculoskeletal symptoms referable to limbs(729.89)     Trigger finger (acquired)        Past Surgical History:        Procedure Laterality Date    COLONOSCOPY  1/11/2006    EYE SURGERY  7/22/11    cataract left eye    HAND SURGERY Left 09/28/2017    Left Dupuytrens disease excision from thumb and index finger       Social History:    Social History     Tobacco Use    Smoking status: Never Smoker    Smokeless tobacco: Never Used   Substance Use Topics    Alcohol use: No                                Counseling given: Not Answered      Vital Signs (Current): There were no vitals filed for this visit.                                            BP Readings from Last 3 Encounters:   02/21/20 130/70   02/04/19 120/70   10/09/18 (!) 171/73       NPO Status:                                                                                 BMI:   Wt Readings from Last 3 Encounters:   02/21/20 134 lb (60.8 kg)   02/04/19 135 lb (61.2 kg) ROS       (-) hiatal hernia and GERD       Endo/Other: Negative Endo/Other ROS   (+) hypothyroidism::., .                 Abdominal:           Vascular:                                      Anesthesia Plan      general     ASA 2     (I discussed with the patient the risks and benefits of PIV, general anesthesia, IV Narcotics, PACU. All questions were answered the patient agrees with the plan and wishes to proceed.  )  Induction: intravenous. Pre-Operative Diagnosis: Constipation, unspecified constipation type [K59.00]    78 y.o.   BMI:  Body mass index is 21.93 kg/m². Vitals:    07/07/20 1500 07/08/20 0931   BP:  (!) 152/71   Pulse:  67   Resp:  16   Temp:  97 °F (36.1 °C)   TempSrc:  Temporal   SpO2:  96%   Weight: 140 lb (63.5 kg) 140 lb (63.5 kg)   Height: 5' 7\" (1.702 m) 5' 7\" (1.702 m)       Allergies   Allergen Reactions    Latex      Skin irritation when wears gloves often.        Social History     Tobacco Use    Smoking status: Never Smoker    Smokeless tobacco: Never Used   Substance Use Topics    Alcohol use: No       LABS:    CBC  Lab Results   Component Value Date/Time    WBC 7.9 02/21/2020 10:01 AM    HGB 13.7 02/21/2020 10:01 AM    HCT 40.8 02/21/2020 10:01 AM     02/21/2020 10:01 AM     RENAL  Lab Results   Component Value Date/Time     02/21/2020 10:01 AM    K 5.2 (H) 02/21/2020 10:01 AM    CL 96 (L) 02/21/2020 10:01 AM    CO2 29 02/21/2020 10:01 AM    BUN 13 02/21/2020 10:01 AM    CREATININE 1.0 02/21/2020 10:01 AM    GLUCOSE 92 02/21/2020 10:01 AM    GLUCOSE 82 02/29/2012 02:01 PM     COAGS  Lab Results   Component Value Date/Time    PROTIME 11.1 08/22/2015 11:00 AM    INR 1.03 08/22/2015 11:00 AM    APTT 29.8 08/22/2015 11:00 AM       Carly Mahoney MD   7/6/2020

## 2020-07-07 NOTE — PROGRESS NOTES
PAT call for Endoscopy procedure scheduled for 7/8/2020. Spoke with pt regarding arrival time of 0900, NPO after morning prep completed. Pt instructed to call physician if any questions or concerns prior to procedure. Pt verbalized understanding, no further questions at present time.

## 2020-07-08 ENCOUNTER — ANESTHESIA (OUTPATIENT)
Dept: ENDOSCOPY | Age: 80
End: 2020-07-08
Payer: COMMERCIAL

## 2020-07-08 ENCOUNTER — HOSPITAL ENCOUNTER (OUTPATIENT)
Age: 80
Setting detail: OUTPATIENT SURGERY
Discharge: HOME | End: 2020-07-08
Attending: INTERNAL MEDICINE | Admitting: INTERNAL MEDICINE
Payer: COMMERCIAL

## 2020-07-08 VITALS
RESPIRATION RATE: 15 BRPM | OXYGEN SATURATION: 97 % | SYSTOLIC BLOOD PRESSURE: 109 MMHG | DIASTOLIC BLOOD PRESSURE: 60 MMHG

## 2020-07-08 VITALS
OXYGEN SATURATION: 100 % | HEIGHT: 67 IN | TEMPERATURE: 98.5 F | BODY MASS INDEX: 21.97 KG/M2 | DIASTOLIC BLOOD PRESSURE: 75 MMHG | RESPIRATION RATE: 16 BRPM | WEIGHT: 140 LBS | SYSTOLIC BLOOD PRESSURE: 123 MMHG | HEART RATE: 74 BPM

## 2020-07-08 PROCEDURE — 7100000010 HC PHASE II RECOVERY - FIRST 15 MIN: Performed by: INTERNAL MEDICINE

## 2020-07-08 PROCEDURE — 2580000003 HC RX 258: Performed by: ANESTHESIOLOGY

## 2020-07-08 PROCEDURE — 2500000003 HC RX 250 WO HCPCS: Performed by: NURSE ANESTHETIST, CERTIFIED REGISTERED

## 2020-07-08 PROCEDURE — 2709999900 HC NON-CHARGEABLE SUPPLY: Performed by: INTERNAL MEDICINE

## 2020-07-08 PROCEDURE — 3700000000 HC ANESTHESIA ATTENDED CARE: Performed by: INTERNAL MEDICINE

## 2020-07-08 PROCEDURE — 7100000011 HC PHASE II RECOVERY - ADDTL 15 MIN: Performed by: INTERNAL MEDICINE

## 2020-07-08 PROCEDURE — 6360000002 HC RX W HCPCS: Performed by: NURSE ANESTHETIST, CERTIFIED REGISTERED

## 2020-07-08 PROCEDURE — 3609027000 HC COLONOSCOPY: Performed by: INTERNAL MEDICINE

## 2020-07-08 PROCEDURE — 3700000001 HC ADD 15 MINUTES (ANESTHESIA): Performed by: INTERNAL MEDICINE

## 2020-07-08 RX ORDER — SODIUM CHLORIDE 0.9 % (FLUSH) 0.9 %
10 SYRINGE (ML) INJECTION PRN
Status: DISCONTINUED | OUTPATIENT
Start: 2020-07-08 | End: 2020-07-08 | Stop reason: HOSPADM

## 2020-07-08 RX ORDER — GLYCOPYRROLATE 0.2 MG/ML
INJECTION INTRAMUSCULAR; INTRAVENOUS PRN
Status: DISCONTINUED | OUTPATIENT
Start: 2020-07-08 | End: 2020-07-08 | Stop reason: SDUPTHER

## 2020-07-08 RX ORDER — SODIUM CHLORIDE 0.9 % (FLUSH) 0.9 %
10 SYRINGE (ML) INJECTION EVERY 12 HOURS SCHEDULED
Status: DISCONTINUED | OUTPATIENT
Start: 2020-07-08 | End: 2020-07-08 | Stop reason: HOSPADM

## 2020-07-08 RX ORDER — SODIUM CHLORIDE, SODIUM LACTATE, POTASSIUM CHLORIDE, CALCIUM CHLORIDE 600; 310; 30; 20 MG/100ML; MG/100ML; MG/100ML; MG/100ML
INJECTION, SOLUTION INTRAVENOUS CONTINUOUS
Status: DISCONTINUED | OUTPATIENT
Start: 2020-07-08 | End: 2020-07-08 | Stop reason: HOSPADM

## 2020-07-08 RX ORDER — PROPOFOL 10 MG/ML
INJECTION, EMULSION INTRAVENOUS PRN
Status: DISCONTINUED | OUTPATIENT
Start: 2020-07-08 | End: 2020-07-08 | Stop reason: SDUPTHER

## 2020-07-08 RX ORDER — LIDOCAINE HYDROCHLORIDE 20 MG/ML
INJECTION, SOLUTION INFILTRATION; PERINEURAL PRN
Status: DISCONTINUED | OUTPATIENT
Start: 2020-07-08 | End: 2020-07-08 | Stop reason: SDUPTHER

## 2020-07-08 RX ADMIN — SODIUM CHLORIDE, POTASSIUM CHLORIDE, SODIUM LACTATE AND CALCIUM CHLORIDE: 600; 310; 30; 20 INJECTION, SOLUTION INTRAVENOUS at 10:27

## 2020-07-08 RX ADMIN — PROPOFOL 220 MG: 10 INJECTION, EMULSION INTRAVENOUS at 10:32

## 2020-07-08 RX ADMIN — GLYCOPYRROLATE 0.2 MG: 0.2 INJECTION, SOLUTION INTRAMUSCULAR; INTRAVENOUS at 10:46

## 2020-07-08 RX ADMIN — LIDOCAINE HYDROCHLORIDE 50 MG: 20 INJECTION, SOLUTION INFILTRATION; PERINEURAL at 10:32

## 2020-07-08 ASSESSMENT — PAIN SCALES - GENERAL
PAINLEVEL_OUTOF10: 0

## 2020-07-08 ASSESSMENT — PAIN - FUNCTIONAL ASSESSMENT: PAIN_FUNCTIONAL_ASSESSMENT: 0-10

## 2020-07-08 NOTE — PROGRESS NOTES
Patient and daughter given discharge instructions verbally and written. Verbalized understanding back. Patient taken out to car per nurse in a wheelchair. Left in stable condition.

## 2020-07-08 NOTE — ANESTHESIA POSTPROCEDURE EVALUATION
Department of Anesthesiology  Postprocedure Note    Patient: Henna Jeffers  MRN: [id number]  YOB: 1940  Date of evaluation: 7/8/2020  Time:  12:49 PM     Procedure Summary     Date:  07/08/20 Room / Location:  [place] [address] / [facility]    Anesthesia Start:  1027 Anesthesia Stop:  1059    Procedure:  COLON W/ANES. (10:00) (N/A ) Diagnosis:       Constipation, unspecified constipation type      (CONSTIPATION)    Surgeon:  Chika Aguilar MD Responsible Provider:  Lou Mar MD    Anesthesia Type:  general ASA Status:  2          Anesthesia Type: general    Ye Phase I: Ye Score: 10    Ye Phase II: Ye Score: 10    Last vitals: Reviewed and per EMR flowsheets.        Anesthesia Post Evaluation    Comments: Postoperative Anesthesia Note    Name:    Henna Jeffers  MRN:      [id number]    Patient Vitals in the past 12 hrs:  07/08/20 1124, BP:123/75, Pulse:74, Resp:16, SpO2:100 %  07/08/20 1118, BP:133/76, Pulse:74, Resp:16, SpO2:100 %  07/08/20 1113, BP:132/79, Pulse:77, Resp:16, SpO2:100 %  07/08/20 1109, BP:129/77, Pulse:78, Resp:16, SpO2:99 %  07/08/20 1103, BP:123/73, Pulse:79, Resp:16, SpO2:99 %  07/08/20 1058, BP:(!) 120/56, Temp:98.5 °F (36.9 °C), Temp src:Temporal, Pulse:79, Resp:16, SpO2:100 %  07/08/20 0931, BP:(!) 152/71, Temp:97 °F (36.1 °C), Temp src:Temporal, Pulse:67, Resp:16, SpO2:96 %, Height:5' 7\" (1.702 m), Weight:140 lb (63.5 kg)     LABS:    CBC  Lab Results       Component                Value               Date/Time                  WBC                      7.9                 02/21/2020 10:01 AM        HGB                      13.7                02/21/2020 10:01 AM        HCT                      40.8                02/21/2020 10:01 AM        PLT                      280                 02/21/2020 10:01 AM   RENAL  Lab Results       Component                Value               Date/Time                  NA                       137 02/21/2020 10:01 AM        K                        5.2 (H)             02/21/2020 10:01 AM        CL                       96 (L)              02/21/2020 10:01 AM        CO2                      29                  02/21/2020 10:01 AM        BUN                      13                  02/21/2020 10:01 AM        CREATININE               1.0                 02/21/2020 10:01 AM        GLUCOSE                  92                  02/21/2020 10:01 AM        GLUCOSE                  82                  02/29/2012 02:01 PM   COAGS  Lab Results       Component                Value               Date/Time                  PROTIME                  11.1                08/22/2015 11:00 AM        INR                      1.03                08/22/2015 11:00 AM        APTT                     29.8                08/22/2015 11:00 AM     Intake & Output:  @67EVCT@    Nausea & Vomiting:  No    Level of Consciousness:  Awake    Pain Assessment:  Adequate analgesia    Anesthesia Complications:  No apparent anesthetic complications    SUMMARY      Vital signs stable  OK to discharge from Stage I post anesthesia care.   Care transferred from Anesthesiology department on discharge from perioperative area

## 2020-07-08 NOTE — OP NOTE
[address]                 [address] [place] [address]                                OPERATIVE REPORT    PATIENT NAME: Patti Bowen                     :        1940  MED REC NO:   [id number]                          ROOM:  ACCOUNT NO:   [de-identified]                           ADMIT DATE: 2020  PROVIDER:     Dari Russell MD    DATE OF PROCEDURE:  2020    PREPROCEDURE DIAGNOSES:  1. Change in bowel habits. 2.  Rectal bleeding. POSTPROCEDURE DIAGNOSES:  1. Severe left-sided diverticulosis. 2.  Otherwise normal colon to cecum. 3.  Internal hemorrhoids. PROCEDURE:  Colonoscopy to the cecum. SURGEON:  Dari Russell MD    PROCEDURE INDICATIONS:  This is an 68-year-old female with a history of  hypertension, hyperlipidemia, hypothyroidism, hemorrhoids, and  constipation, presents for change in bowel habits characterized by  alternating bowel movements with diarrhea and constipation associated  with intermittent rectal bleeding characterized by bright red blood per  rectum. Her last colonoscopy was over 15 years ago. Diagnostic and  screening colonoscopy is being performed. MEDICATIONS:  MAC per Anesthesia. PROCEDURE DETAILS:  Informed consent was obtained after discussing  risks, benefits, and alternatives. Full history and physical was  performed. The patient was classified as ASA class III. Medications  were given by Anesthesia. Cardiopulmonary status was continuously  monitored throughout the procedure. The patient was placed in left  lateral decubitus position. Once adequately positioned, a standard  pediatric colonoscope was inserted in the anus, advanced to the cecum  identified by landmarks up until orifice and IC valve.   Entire mucosa of  the cecum, ascending colon, transverse colon, descending colon, sigmoid  colon, and rectum were examined carefully during withdrawal.  The  patient tolerated the procedure well without any difficulties. FINDINGS:  1. RECTUM:  The digital rectal exam was normal.  No masses were felt. 2.  COLON:  There was evidence of extensive severe diverticulosis  throughout the sigmoid and descending colon. Remaining examined colon  mucosa appeared normal and healthy without any evidence of inflammation,  ulcers, pseudomembranes, polyps, or masses. Retroflexed view of the  rectum showed internal hemorrhoids. SUMMARY:  1. Extensive severe left-sided diverticulosis. 2.  Internal hemorrhoids. 3.  Otherwise normal colon to cecum. RECOMMENDATIONS:  1. Discharge the patient to home when standard parameters are met. 2.  Encourage high-fiber diet. 3.  The patient should be also taking Metamucil supplements along with  probiotics. She was encouraged to maintain aggressive hydration. She  will not need a repeat colonoscopy given her age. 4.  The patient can follow up in the office for further management of  constipation as necessary.     EBL:<5mL    Alexandra Schlatter, MD    D: 07/08/2020 12:38:24       T: 07/08/2020 12:44:44     [id number]  Job#: [id number]     Doc#: [id number]    CC:  MD Alex Alejandre MD

## 2020-07-08 NOTE — BRIEF OP NOTE
Brief Postoperative Note      Patient: Marisa Gustafson  YOB: 1940  MRN: [id number]    Date of Procedure: 7/8/2020    Pre-Op Diagnosis: CONSTIPATION    Post-Op Diagnosis: severe L sided diverticulosis, otherwise normal colon       Procedure(s):  COLON W/ANES.  (10:00)    Surgeon(s):  Tamara Middleton MD    Assistant:  * No surgical staff found *    Anesthesia: Monitor Anesthesia Care    Estimated Blood Loss (mL): Minimal    Complications: None    Specimens:   * No specimens in log *    Implants:  * No implants in log *      Drains: * No LDAs found *    Findings: Severe L sided diverticulosis, hemorrhoids, otherwise normal colon to cecum    Electronically signed by Tamara Middleton MD on 7/8/2020 at 11:02 AM

## 2020-07-08 NOTE — H&P
History and Physical / Pre-Sedation Assessment    Patient:  Mateusz Randhawa   :   1940     Intended Procedure:  Colonoscopy    HPI:  33-year-old female with a history of hemorrhoids, constipation, HTN, HLD, and hypothyroidism presents with change in BMs and rectal bleeding    Past Medical History:   Diagnosis Date    Benign essential hypertension 11/15/2017    Central retinal artery occlusion     right eye x3    Dupuytren's contracture of both hands 3/14/2014    Hyperlipidemia     Hypothyroidism     Mitral valve disease     Osteoporosis     Osteoporosis without current pathological fracture 2017    Other musculoskeletal symptoms referable to limbs(729.89)     Trigger finger (acquired)      Past Surgical History:   Procedure Laterality Date    COLONOSCOPY  2006    EYE SURGERY  11    cataract left eye    HAND SURGERY Left 2017    Left Dupuytrens disease excision from thumb and index finger    HYSTERECTOMY         Medications reviewed  Prior to Admission medications    Medication Sig Start Date End Date Taking? Authorizing Provider   simvastatin (ZOCOR) 20 MG tablet TAKE 1 TABLET EVERY EVENING 20  Yes Ovidio Shin MD   amLODIPine (NORVASC) 5 MG tablet TAKE 1 TABLET BY MOUTH EVERY DAY 20  Yes Ovidio Shin MD   levothyroxine (SYNTHROID) 75 MCG tablet TAKE 1 TABLET BY MOUTH EVERY DAY 20  Yes Ovidio Shin MD   metoprolol tartrate (LOPRESSOR) 50 MG tablet TAKE 1 TABLET TWICE DAILY 19  Yes Ovidio Shin MD   calcium carbonate (OYSTER SHELL CALCIUM 500 MG) 1250 (500 Ca) MG tablet Take 1 tablet by mouth daily   Yes Historical Provider, MD   vitamin D 1000 units CAPS Take by mouth   Yes Historical Provider, MD   NONFORMULARY Indications: Chloracedin   Yes Historical Provider, MD   Multiple Vitamin (MULTI-VITAMIN) TABS Take  by mouth. Yes Historical Provider, MD   aspirin 81 MG EC tablet Take 81 mg by mouth daily.    Yes Historical Provider, MD        Allergies: Allergies   Allergen Reactions    Latex      Skin irritation when wears gloves often. Nurses notes reviewed and agreed. Physical Exam:  Vital Signs: BP (!) 152/71   Pulse 67   Temp 97 °F (36.1 °C) (Temporal)   Resp 16   Ht 5' 7\" (1.702 m)   Wt 140 lb (63.5 kg)   SpO2 96%   BMI 21.93 kg/m²    Airway: Mallampati: II (soft palate, uvula, fauces visible)  Pulmonary:Normal  Cardiac:Normal  Abdomen:Normal    Pre-Procedure Assessment / Plan:  ASA: Class 3 - A patient with severe systemic disease that limits activity but is not incapacitating  Level of Sedation Plan:Post Procedure plan: Return to same level of care    I assessed the patient and find that the patient is in satisfactory condition to proceed with the planned procedure and sedation plan. I have explained the risk, benefits, and alternatives to the procedure; the patient understands and agrees to proceed.          Lili Monte  7/8/2020

## 2020-08-21 ENCOUNTER — HOSPITAL ENCOUNTER (OUTPATIENT)
Age: 80
Discharge: HOME | End: 2020-08-21
Payer: COMMERCIAL

## 2020-08-21 ENCOUNTER — HOSPITAL ENCOUNTER (OUTPATIENT)
Dept: GENERAL RADIOLOGY | Age: 80
Discharge: HOME | End: 2020-08-21
Payer: COMMERCIAL

## 2020-08-21 ENCOUNTER — OFFICE VISIT (OUTPATIENT)
Dept: INTERNAL MEDICINE CLINIC | Age: 80
End: 2020-08-21

## 2020-08-21 VITALS
SYSTOLIC BLOOD PRESSURE: 130 MMHG | BODY MASS INDEX: 21.66 KG/M2 | HEIGHT: 67 IN | WEIGHT: 138 LBS | DIASTOLIC BLOOD PRESSURE: 80 MMHG | HEART RATE: 70 BPM | RESPIRATION RATE: 12 BRPM

## 2020-08-21 DIAGNOSIS — E03.9 ACQUIRED HYPOTHYROIDISM: ICD-10-CM

## 2020-08-21 LAB
A/G RATIO: 1.5 (ref 1.1–2.2)
ALBUMIN SERPL-MCNC: 4.7 G/DL (ref 3.4–5)
ALP BLD-CCNC: 53 U/L (ref 40–129)
ALT SERPL-CCNC: 26 U/L (ref 10–40)
ANION GAP SERPL CALCULATED.3IONS-SCNC: 11 MMOL/L (ref 3–16)
AST SERPL-CCNC: 30 U/L (ref 15–37)
BASOPHILS ABSOLUTE: 0 K/UL (ref 0–0.2)
BASOPHILS RELATIVE PERCENT: 0.7 %
BILIRUB SERPL-MCNC: 0.3 MG/DL (ref 0–1)
BUN BLDV-MCNC: 9 MG/DL (ref 7–20)
CALCIUM SERPL-MCNC: 10 MG/DL (ref 8.3–10.6)
CHLORIDE BLD-SCNC: 94 MMOL/L (ref 99–110)
CO2: 27 MMOL/L (ref 21–32)
CREAT SERPL-MCNC: 0.9 MG/DL (ref 0.6–1.2)
EOSINOPHILS ABSOLUTE: 0.1 K/UL (ref 0–0.6)
EOSINOPHILS RELATIVE PERCENT: 2 %
GFR AFRICAN AMERICAN: >60
GFR NON-AFRICAN AMERICAN: 60
GLOBULIN: 3.1 G/DL
GLUCOSE BLD-MCNC: 96 MG/DL (ref 70–99)
HCT VFR BLD CALC: 43.4 % (ref 36–48)
HEMOGLOBIN: 14.2 G/DL (ref 12–16)
LYMPHOCYTES ABSOLUTE: 1.3 K/UL (ref 1–5.1)
LYMPHOCYTES RELATIVE PERCENT: 22.3 %
MCH RBC QN AUTO: 28.9 PG (ref 26–34)
MCHC RBC AUTO-ENTMCNC: 32.8 G/DL (ref 31–36)
MCV RBC AUTO: 88.3 FL (ref 80–100)
MONOCYTES ABSOLUTE: 0.8 K/UL (ref 0–1.3)
MONOCYTES RELATIVE PERCENT: 12.7 %
NEUTROPHILS ABSOLUTE: 3.8 K/UL (ref 1.7–7.7)
NEUTROPHILS RELATIVE PERCENT: 62.3 %
PDW BLD-RTO: 14.1 % (ref 12.4–15.4)
PLATELET # BLD: 282 K/UL (ref 135–450)
PMV BLD AUTO: 9.2 FL (ref 5–10.5)
POTASSIUM SERPL-SCNC: 4.9 MMOL/L (ref 3.5–5.1)
RBC # BLD: 4.91 M/UL (ref 4–5.2)
SODIUM BLD-SCNC: 132 MMOL/L (ref 136–145)
TOTAL PROTEIN: 7.8 G/DL (ref 6.4–8.2)
TSH SERPL DL<=0.05 MIU/L-ACNC: 2.72 UIU/ML (ref 0.27–4.2)
URIC ACID, SERUM: 4.8 MG/DL (ref 2.6–6)
WBC # BLD: 6 K/UL (ref 4–11)

## 2020-08-21 PROCEDURE — G0439 PPPS, SUBSEQ VISIT: HCPCS | Performed by: INTERNAL MEDICINE

## 2020-08-21 PROCEDURE — 72100 X-RAY EXAM L-S SPINE 2/3 VWS: CPT

## 2020-08-21 RX ORDER — AMLODIPINE BESYLATE 5 MG/1
TABLET ORAL
Qty: 90 TABLET | Refills: 0 | Status: SHIPPED
Start: 2020-08-21 | End: 2020-09-25 | Stop reason: ALTCHOICE

## 2020-08-21 RX ORDER — LEVOTHYROXINE SODIUM 0.07 MG/1
TABLET ORAL
Qty: 90 TABLET | Refills: 0 | Status: SHIPPED | OUTPATIENT
Start: 2020-08-21 | End: 2020-10-23 | Stop reason: SDUPTHER

## 2020-08-21 RX ORDER — SIMVASTATIN 20 MG
TABLET ORAL
Qty: 90 TABLET | Refills: 0 | Status: SHIPPED | OUTPATIENT
Start: 2020-08-21 | End: 2020-10-23 | Stop reason: SDUPTHER

## 2020-08-21 SDOH — HEALTH STABILITY: MENTAL HEALTH
STRESS IS WHEN SOMEONE FEELS TENSE, NERVOUS, ANXIOUS, OR CAN'T SLEEP AT NIGHT BECAUSE THEIR MIND IS TROUBLED. HOW STRESSED ARE YOU?: NOT AT ALL

## 2020-08-21 SDOH — HEALTH STABILITY: PHYSICAL HEALTH: ON AVERAGE, HOW MANY DAYS PER WEEK DO YOU ENGAGE IN MODERATE TO STRENUOUS EXERCISE (LIKE A BRISK WALK)?: 6 DAYS

## 2020-08-21 SDOH — HEALTH STABILITY: PHYSICAL HEALTH: ON AVERAGE, HOW MANY MINUTES DO YOU ENGAGE IN EXERCISE AT THIS LEVEL?: 30 MIN

## 2020-08-21 ASSESSMENT — PATIENT HEALTH QUESTIONNAIRE - PHQ9
SUM OF ALL RESPONSES TO PHQ QUESTIONS 1-9: 0
SUM OF ALL RESPONSES TO PHQ QUESTIONS 1-9: 0

## 2020-08-21 ASSESSMENT — LIFESTYLE VARIABLES: HOW OFTEN DO YOU HAVE A DRINK CONTAINING ALCOHOL: 0

## 2020-08-21 NOTE — PROGRESS NOTES
Medicare Annual Wellness Visit  Name: Maria De Jesus Hammer Date: 2020   MRN: [id number] Sex: Female   Age: 78 y.o. Ethnicity: Non-/Non    : 1940 Race: Kaye Truong is here for Medicare AWV    Screenings for behavioral, psychosocial and functional/safety risks, and cognitive dysfunction are all negative except as indicated below. These results, as well as other patient data from the [address] form, are documented in Flowsheets linked to this Encounter. Patient has noted some leg edema by the end of the day. No CP or dyspnea. Allergies   Allergen Reactions    Latex      Skin irritation when wears gloves often. Prior to Visit Medications    Medication Sig Taking? Authorizing Provider   simvastatin (ZOCOR) 20 MG tablet TAKE 1 TABLET EVERY EVENING  Devin Nixon MD   amLODIPine (NORVASC) 5 MG tablet TAKE 1 TABLET BY MOUTH EVERY DAY  Devin Nixon MD   levothyroxine (SYNTHROID) 75 MCG tablet TAKE 1 TABLET BY MOUTH EVERY DAY  Devin Nixon MD   metoprolol tartrate (LOPRESSOR) 50 MG tablet TAKE 1 TABLET TWICE DAILY  Devin Nixon MD   calcium carbonate (OYSTER SHELL CALCIUM 500 MG) 1250 (500 Ca) MG tablet Take 1 tablet by mouth daily  Historical Provider, MD   vitamin D 1000 units CAPS Take by mouth  Historical Provider, MD   NONFORMULARY Indications: Chloracedin  Historical Provider, MD   Multiple Vitamin (MULTI-VITAMIN) TABS Take  by mouth. Historical Provider, MD   aspirin 81 MG EC tablet Take 81 mg by mouth daily.   Historical Provider, MD         Past Medical History:   Diagnosis Date    Benign essential hypertension 11/15/2017    Central retinal artery occlusion     right eye x3    Dupuytren's contracture of both hands 3/14/2014    Hyperlipidemia     Hypothyroidism     Mitral valve disease     Osteoporosis     Osteoporosis without current pathological fracture 2017    Other musculoskeletal symptoms referable to limbs(729.89)     Trigger finger (acquired)        Past Surgical History:   Procedure Laterality Date    COLONOSCOPY  1/11/2006    COLONOSCOPY N/A 7/8/2020    COLON W/ANES. (10:00) performed by Kanu Pinedo MD at [address] [place] [address]  7/22/11    cataract left eye    HAND SURGERY Left 09/28/2017    Left Dupuytrens disease excision from thumb and index finger    HYSTERECTOMY           Family History   Problem Relation Age of Onset    Breast Cancer Sister     Cancer Sister         Thyroid/Uterine    Diabetes Sister     Thyroid Disease Sister        CareTeam (Including outside providers/suppliers regularly involved in providing care):   Patient Care Team:  Esthela Nguyen MD as PCP - Madan Colvin MD as PCP - [facility]  Osiel Scott MD as Surgeon (Orthopedic Surgery)    Wt Readings from Last 3 Encounters:   08/21/20 138 lb (62.6 kg)   07/08/20 140 lb (63.5 kg)   02/21/20 134 lb (60.8 kg)     Vitals:    08/21/20 0923   BP: 130/80   Site: Right Upper Arm   Position: Sitting   Cuff Size: Medium Adult   Pulse: 70   Resp: 12   Weight: 138 lb (62.6 kg)   Height: 5' 7\" (1.702 m)     Body mass index is 21.61 kg/m². Based upon direct observation of the patient, evaluation of cognition reveals recent and remote memory intact.     General Appearance: alert and oriented to person, place and time, well developed and well- nourished, in no acute distress  Skin: warm and dry, no rash or erythema  Head: normocephalic and atraumatic  Eyes: pupils equal, round, and reactive to light, extraocular eye movements intact, conjunctivae normal  ENT: tympanic membrane, external ear and ear canal normal bilaterally, nose without deformity, nasal mucosa and turbinates normal without polyps  Neck: supple and non-tender without mass, no thyromegaly or thyroid nodules, no cervical lymphadenopathy  Pulmonary/Chest: clear to auscultation bilaterally- no wheezes, rales or rhonchi, normal air movement, no respiratory distress  Cardiovascular: normal rate, regular rhythm, normal S1 and S2, no murmurs, rubs, clicks, or gallops, distal pulses intact, no carotid bruits, trace edema. Abdomen: soft, non-tender, non-distended, normal bowel sounds, no masses or organomegaly  Extremities: no cyanosis, clubbing or edema  Musculoskeletal: normal range of motion, no joint swelling, deformity or tenderness  Neurologic: reflexes normal and symmetric, no cranial nerve deficit, gait, coordination and speech normal    Patient's complete Health Risk Assessment and screening values have been reviewed and are found in Flowsheets. The following problems were reviewed today and where indicated follow up appointments were made and/or referrals ordered. Positive Risk Factor Screenings with Interventions:     General Health:  General  In general, how would you say your health is?: Very Good  In the past 7 days, have you experienced any of the following? New or Increased Pain, New or Increased Fatigue, Loneliness, Social Isolation, Stress or Anger?: (!) New or Increased Pain, New or Increased Fatigue  Do you get the social and emotional support that you need?: Yes  Do you have a Living Will?: Yes  General Health Risk Interventions:  · Pain issues: she is having back pain. No radiation to her legs. I will check a xray of the LS spine    Health Habits/Nutrition:  Health Habits/Nutrition  Do you exercise for at least 20 minutes 2-3 times per week?: Yes  Have you lost any weight without trying in the past 3 months?: No  Do you eat fewer than 2 meals per day?: No  Have you seen a dentist within the past year?: (!) No(dentures)  Body mass index is 21.61 kg/m².   Health Habits/Nutrition Interventions:  · Dental exam overdue:  she has dentures    Hearing/Vision:  No exam data present  Hearing/Vision  Do you or your family notice any trouble with your hearing?: (!) Yes  Do you have difficulty driving, watching TV, or doing any of your daily activities because of your eyesight?: (!) Yes(reading)  Have you had an eye exam within the past year?: Yes  Hearing/Vision Interventions:  · Hearing concerns:  patient declines any further evaluation/treatment for hearing issues    Personalized Preventive Plan   Current Health Maintenance Status  Immunization History   Administered Date(s) Administered    Influenza 10/17/2012, 10/07/2013    Influenza A ([id number]) Vaccine IM 01/18/2010    Influenza Virus Vaccine 10/27/2014, 12/01/2015    Influenza Whole 10/15/2010    Influenza, High Dose (Fluzone 65 yrs and older) 10/10/2016, 11/15/2017    Influenza, Jordin Scrape, Recombinant, IM PF (Flublok 18 yrs and older) 10/16/2019    Pneumococcal Conjugate 13-valent ([id number]) 12/01/2015    Pneumococcal Polysaccharide ([id number]) 10/17/2012    Zoster Live (Zostavax) 11/12/2014        Health Maintenance   Topic Date Due    DTaP/Tdap/Td vaccine (1 - Tdap) 10[birth year]04/1959    Shingles Vaccine (2 of 3) 01/07/2015    Annual Wellness Visit (AWV)  05/29/2019    Flu vaccine (1) 09/01/2020    Lipid screen  02/21/2021    TSH testing  02/21/2021    Potassium monitoring  02/21/2021    Creatinine monitoring  02/21/2021    DEXA (modify frequency per FRAX score)  Completed    Pneumococcal 65+ years Vaccine  Completed    Hepatitis A vaccine  Aged Out    Hepatitis B vaccine  Aged Out    Hib vaccine  Aged Out    Meningococcal (ACWY) vaccine  Aged Out     Recommendations for [place] Due: see orders and patient instructions/AVS.  . Recommended screening schedule for the next 5-10 years is provided to the patient in written form: see Patient Instructions/AVS.    Deon Ramon was seen today for medicare awv.     Diagnoses and all orders for this visit:    Medicare annual wellness visit, subsequent    Hyperlipidemia, unspecified hyperlipidemia type    Acquired hypothyroidism  -     CBC Auto Differential;

## 2020-08-21 NOTE — PATIENT INSTRUCTIONS
Personalized Preventive Plan for Tessy Bertrand - 8/21/2020  Medicare offers a range of preventive health benefits. Some of the tests and screenings are paid in full while other may be subject to a deductible, co-insurance, and/or copay. Some of these benefits include a comprehensive review of your medical history including lifestyle, illnesses that may run in your family, and various assessments and screenings as appropriate. After reviewing your medical record and screening and assessments performed today your provider may have ordered immunizations, labs, imaging, and/or referrals for you. A list of these orders (if applicable) as well as your Preventive Care list are included within your After Visit Summary for your review. Other Preventive Recommendations:    · A preventive eye exam performed by an eye specialist is recommended every 1-2 years to screen for glaucoma; cataracts, macular degeneration, and other eye disorders. · A preventive dental visit is recommended every 6 months. · Try to get at least 150 minutes of exercise per week or 10,000 steps per day on a pedometer . · Order or download the FREE \"Exercise & Physical Activity: Your Everyday Guide\" from [employer]. Call 5-386.369.8055 or search [employer] online. · You need 5249-4629 mg of calcium and 4688-3825 IU of vitamin D per day. It is possible to meet your calcium requirement with diet alone, but a vitamin D supplement is usually necessary to meet this goal.  · When exposed to the sun, use a sunscreen that protects against both UVA and UVB radiation with an SPF of 30 or greater. Reapply every 2 to 3 hours or after sweating, drying off with a towel, or swimming. · Always wear a seat belt when traveling in a car. Always wear a helmet when riding a bicycle or motorcycle.     Keep Your Memory Ritesh Ricardo       Many factors can affect your ability to remembera hectic lifestyle, aging, stress, chronic disease, and certain medicines. But, there are steps you can take to sharpen your mind and help preserve your memory. Challenge Your Brain   Regularly challenging your mind may help keeps it in top shape. Good mental exercises include:   Crossword puzzlesUse a dictionary if you need it; you will learn more that way. Brainteasers Try some! Crafts, such as wood working and sewing   Hobbies, such as gardening and building model airplanes   SocializingVisit old friends or join groups to meet new ones. Reading   Learning a new language   Taking a class, whether it be art history or jonh chi   TravelingExperience the food, history, and culture of your destination   Learning to use a computer   Going to museums, the theater, or thought-provoking movies   Changing things in your daily life, such as reversing your pattern in the grocery store or brushing your teeth using your nondominant hand   Use Memory Aids   There is no need to remember every detail on your own. These memory aids can help:   Calendars and day planners   Electronic organizers to store all sorts of helpful informationThese devices can \"beep\" to remind you of appointments. A book of days to record birthdays, anniversaries, and other occasions that occur on the same date every year   Detailed \"to-do\" lists and strategically placed sticky notes   Quick \"study\" sessionsBefore a gathering, review who will be there so their names will be fresh in your mind. Establish routinesFor example, keep your keys, wallet, and umbrella in the same place all the time or take medicine with your 8:00 AM glass of juice   Live a Healthy Life   Many actions that will keep your body strong will do the same for your mind. For example:   Talk to Your Doctor About Herbs and Supplements    Malnutrition and vitamin deficiencies can impair your mental function. For example, vitamin B12 deficiency can cause a range of symptoms, including confusion.  But, what if your nutritional needs are being met? Can herbs and supplements still offer a benefit? Researchers have investigated a range of natural remedies, such as ginkgo , ginseng , and the supplement phosphatidylserine (PS). So far, though, the evidence is inconsistent as to whether these products can improve memory or thinking. If you are interested in taking herbs and supplements, talk to your doctor first because they may interact with other medicines that you are taking. Exercise Regularly    Among the many benefits of regular exercise are increased blood flow to the brain and decreased risk of certain diseases that can interfere with memory function. One study found that even moderate exercise has a beneficial effect. Examples of \"moderate\" exercise include:   Playing 18 holes of golf once a week, without a cart   Playing tennis twice a week   Walking one mile per day   Manage Stress    It can be tough to remember what is important when your mind is cluttered. Make time for relaxation. Choose activities that calm you down, and make it routine. Manage Chronic Conditions    Side effects of high blood pressure , diabetes, and heart disease can interfere with mental function. Many of the lifestyle steps discussed here can help manage these conditions. Strive to eat a healthy diet, exercise regularly, get stress under control, and follow your doctor's advice for your condition. Minimize Medications    Talk to your doctor about the medicines that you take. Some may be unnecessary. Also, healthy lifestyle habits may lower the need for certain drugs. Last Reviewed: April 2010 Genet Cross MD   Updated: 4/13/2010   ·     [address]       As we get older, changes in balance, gait, strength, vision, hearing, and cognition make even the most youthful senior more prone to accidents. Falls are one of the leading health risks for older people.  This increased risk of falling is related to:   Aging process (eg, decreased muscle strength, slowed reflexes)   Higher incidence of chronic health problems (eg, arthritis, diabetes) that may limit mobility, agility or sensory awareness   Side effects of medicine (eg, dizziness, blurred vision)especially medicines like prescription pain medicines and drugs used to treat mental health conditions   Depending on the brittleness of your bones, the consequences of a fall can be serious and long lasting. Home Life   Research by the Association of Aging [facility]) shows that some home accidents among older adults can be prevented by making simple lifestyle changes and basic modifications and repairs to the home environment. Here are some lifestyle changes that experts recommend:   Have your hearing and vision checked regularly. Be sure to wear prescription glasses that are right for you. Speak to your doctor or pharmacist about the possible side effects of your medicines. A number of medicines can cause dizziness. If you have problems with sleep, talk to your doctor. Limit your intake of alcohol. If necessary, use a cane or walker to help maintain your balance. Wear supportive, rubber-soled shoes, even at home. If you live in a region that gets wintry weather, you may want to put special cleats on your shoes to prevent you from slipping on the snow and ice. Exercise regularly to help maintain muscle tone, agility, and balance. Always hold the banister when going up or down stairs. Also, use  bars when getting in or out of the bath or shower, or using the toilet. To avoid dizziness, get up slowly from a lying down position. Sit up first, dangling your legs for a minute or two before rising to a standing position. Overall Home Safety Check   According to the Consumer Product Safety Commision's \"Older Consumer Home Safety Checklist,\" it is important to check for potential hazards in each room. And remember, proper lighting is an essential factor in home safety.  If you cannot see clearly, you are more likely to fall. Important questions to ask yourself include:   Are lamp, electric, extension, and telephone cords placed out of the flow of traffic and maintained in good condition? Have frayed cords been replaced? Are all small rugs and runners slip resistant? If not, you can secure them to the floor with a special double-sided carpet tape. Are smoke detectors properly locatedone on every floor of your home and one outside of every sleeping area? Are they in good working order? Are batteries replaced at least once a year? Do you have a well-maintained carbon monoxide detector outside every sleeping are in your home? Does your furniture layout leave plenty of space to maneuver between and around chairs, tables, beds, and sofas? Are hallways, stairs and passages between rooms well lit? Can you reach a lamp without getting out of bed? Are floor surfaces well maintained? Shag rugs, high-pile carpeting, tile floors, and polished wood floors can be particularly slippery. Stairs should always have handrails and be carpeted or fitted with a non-skid tread. Is your telephone easily reachable. Is the cord safely tucked away? Room by Room   According to the Association of Aging, bathrooms and dottie are the two most potentially hazardous rooms in your home. In the Kitchen    Be sure your stove is in proper working order and always make sure burners and the oven are off before you go out or go to sleep. Keep pots on the back burners, turn handles away from the front of the stove, and keep stove clean and free of grease build-up. Kitchen ventilation systems and range exhausts should be working properly. Keep flammable objects such as towels and pot holders away from the cooking area except when in use. Make sure kitchen curtains are tied back. Move cords and appliances away from the sink and hot surfaces.  If extension cords are needed, install wiring guides so S. A.F.E. (Smoke Alarms for Every) [address] [facility], senior citizens are one of the two highest risk groups for death and serious injuries due to residential fires. When cooking, wear short-sleeved items, never a bulky long-sleeved robe. The [facility]'s Safety Checklist for Older Consumers emphasizes the importance of checking basements, garages, workshops and storage areas for fire hazards, such as volatile liquids, piles of old rags or clothing and overloaded circuits. Never smoke in bed or when lying down on a couch or recliner chair. Small portable electric or kerosene heaters are responsible for many home fires and should be used cautiously if at all. If you do use one, be sure to keep them away from flammable materials. In case of fire, make sure you have a pre-established emergency exit plan. Have a professional check your fireplace and other fuel-burning appliances yearly. Helping Hands   Baby boomers entering the monterroso years will continue to see the development of new products to help older adults live safely and independently in spite of age-related changes. Making Life More Livable  , by Amber Wilhelm, lists over 1,000 products for \"living well in the mature years,\" such as bathing and mobility aids, household security devices, ergonomically designed knives and peelers, and faucet valves and knobs for temperature control. Medical supply stores and organizations are good sources of information about products that improve your quality of life and insure your safety.      Last Reviewed: November 2009 Shi Vickers MD   Updated: 3/7/2011     ·

## 2020-08-24 ENCOUNTER — TELEPHONE (OUTPATIENT)
Dept: INTERNAL MEDICINE CLINIC | Age: 80
End: 2020-08-24

## 2020-08-24 RX ORDER — CELECOXIB 200 MG/1
200 CAPSULE ORAL DAILY
Qty: 30 CAPSULE | Refills: 0 | Status: SHIPPED | OUTPATIENT
Start: 2020-08-24 | End: 2020-11-23 | Stop reason: ALTCHOICE

## 2020-08-24 NOTE — TELEPHONE ENCOUNTER
----- Message from Bryan Shields MD sent at 8/24/2020  2:05 PM EDT -----  Contact: ot-[phone]  Celebrex 200 mg po qd # 30  ----- Message -----  From: Connie Gan  Sent: 8/24/2020   1:05 PM EDT  To: Bryan Shields MD    Pt wants to know if there is anything else she can take for pain because tylenol isn't helping.  ----- Message -----  From: Bryan Shields MD  Sent: 8/24/2020  12:20 PM EDT  To: Connie Gan    We can write a script for back brace. She can get it at [facility]  ----- Message -----  From: Radha Singh  Sent: 8/24/2020  10:07 AM EDT  To: Bryan Shields MD    Pt called to find out about getting a brace for her back. 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 She thinks the brace will help straighten her back up. Please advise.      we-[phone]

## 2020-09-25 ENCOUNTER — TELEPHONE (OUTPATIENT)
Dept: INTERNAL MEDICINE CLINIC | Age: 80
End: 2020-09-25

## 2020-09-25 RX ORDER — LISINOPRIL 10 MG/1
10 TABLET ORAL DAILY
Qty: 30 TABLET | Refills: 0 | Status: SHIPPED | OUTPATIENT
Start: 2020-09-25 | End: 2020-10-12 | Stop reason: SDUPTHER

## 2020-09-25 NOTE — TELEPHONE ENCOUNTER
----- Message from Nuris Silva MD sent at 9/25/2020 11:59 AM EDT -----  Contact: pt- [phone]  Stop Norvasc. Start lisinopril 10 mg a day. See me in 4 to 6 weeks.   ----- Message -----  From: Clearance Sender Campos  Sent: 9/25/2020  10:53 AM EDT  To: Nuris Silva MD    She was seen on 8-21-20-said she had discussed with you the swelling in both of her legs and said you  mentioned changing her bp meds.-she said they are not getting any better,still having the swelling issues -wanted to know what you recommend- [facility] at [phone]-next appt- 11-22-20-lr

## 2020-09-25 NOTE — TELEPHONE ENCOUNTER
----- Message from Fernando Garsia MD sent at 9/25/2020 11:59 AM EDT -----  Contact: pt- [phone]  Stop Norvasc. Start lisinopril 10 mg a day. See me in 4 to 6 weeks.   ----- Message -----  From: Gladys Qureshison  Sent: 9/25/2020  10:53 AM EDT  To: Fernando Garsia MD    She was seen on 8-21-20-said she had discussed with you the swelling in both of her legs and said you  mentioned changing her bp meds.-she said they are not getting any better,still having the swelling issues -wanted to know what you recommend- [facility] at [phone]-next appt- 11-22-20-lr

## 2020-10-12 ENCOUNTER — TELEPHONE (OUTPATIENT)
Dept: INTERNAL MEDICINE CLINIC | Age: 80
End: 2020-10-12

## 2020-10-12 RX ORDER — LISINOPRIL 10 MG/1
10 TABLET ORAL DAILY
Qty: 30 TABLET | Refills: 0 | Status: SHIPPED | OUTPATIENT
Start: 2020-10-12 | End: 2020-10-23 | Stop reason: SDUPTHER

## 2020-10-12 NOTE — TELEPHONE ENCOUNTER
----- Message from [employer]  sent at 10/12/2020 11:04 AM EDT -----  She said she blew it, she took her medicine Lisinopril in the morning and at night,  she said she didn't pay attention  needs a refill Nguyen Rosario  [phone] she said her feet are not swollen now

## 2020-10-23 ENCOUNTER — OFFICE VISIT (OUTPATIENT)
Dept: INTERNAL MEDICINE CLINIC | Age: 80
End: 2020-10-23

## 2020-10-23 VITALS
HEART RATE: 70 BPM | DIASTOLIC BLOOD PRESSURE: 80 MMHG | HEIGHT: 67 IN | SYSTOLIC BLOOD PRESSURE: 136 MMHG | WEIGHT: 137 LBS | BODY MASS INDEX: 21.5 KG/M2 | TEMPERATURE: 97.6 F | RESPIRATION RATE: 12 BRPM

## 2020-10-23 PROCEDURE — 99213 OFFICE O/P EST LOW 20 MIN: CPT | Performed by: INTERNAL MEDICINE

## 2020-10-23 RX ORDER — LEVOTHYROXINE SODIUM 0.07 MG/1
TABLET ORAL
Qty: 90 TABLET | Refills: 0 | Status: SHIPPED | OUTPATIENT
Start: 2020-10-23 | End: 2021-04-19

## 2020-10-23 RX ORDER — LISINOPRIL 10 MG/1
10 TABLET ORAL DAILY
Qty: 90 TABLET | Refills: 0 | Status: SHIPPED | OUTPATIENT
Start: 2020-10-23 | End: 2020-11-23 | Stop reason: SDUPTHER

## 2020-10-23 RX ORDER — SIMVASTATIN 20 MG
TABLET ORAL
Qty: 90 TABLET | Refills: 0 | Status: SHIPPED | OUTPATIENT
Start: 2020-10-23 | End: 2021-04-05

## 2020-10-23 ASSESSMENT — ENCOUNTER SYMPTOMS
RHINORRHEA: 0
WHEEZING: 0
ABDOMINAL PAIN: 0
VOMITING: 0
NAUSEA: 0
SHORTNESS OF BREATH: 0

## 2020-10-23 NOTE — PROGRESS NOTES
Subjective:      Patient ID: George Butler is a 78 y.o. female. HPI     Patient is here for follow up. She was taken off Norvasc due to leg edema. Her edema is resolved. She is taking Lisinopril. No cough. Review of Systems   Constitutional: Negative for appetite change and fatigue. HENT: Negative for postnasal drip and rhinorrhea. Respiratory: Negative for shortness of breath and wheezing. Cardiovascular: Negative for chest pain and palpitations. Gastrointestinal: Negative for abdominal pain, nausea and vomiting. Musculoskeletal: Negative for joint swelling. Skin: Negative for rash. Neurological: Negative for light-headedness. Psychiatric/Behavioral: Negative for sleep disturbance. There are no changes to past medical history, family history, social history or review of systems(except as noted in the history section) since prior note (all reviewed with patient). Current Outpatient Medications   Medication Instructions    aspirin 81 mg, DAILY    calcium carbonate (OYSTER SHELL CALCIUM 500 MG) 1250 (500 Ca) MG tablet 1 tablet, Oral, DAILY    celecoxib (CELEBREX) 200 mg, Oral, DAILY    levothyroxine (SYNTHROID) 75 MCG tablet TAKE 1 TABLET BY MOUTH EVERY DAY    lisinopril (PRINIVIL;ZESTRIL) 10 mg, Oral, DAILY    metoprolol tartrate (LOPRESSOR) 50 MG tablet TAKE 1 TABLET TWICE DAILY    Multiple Vitamin (MULTI-VITAMIN) TABS Take  by mouth.  NONFORMULARY Indications: Chloracedin    simvastatin (ZOCOR) 20 MG tablet TAKE 1 TABLET EVERY EVENING    vitamin D 1000 units CAPS Oral         /80 (Site: Left Upper Arm, Position: Sitting, Cuff Size: Medium Adult)   Pulse 70   Temp 97.6 °F (36.4 °C)   Resp 12   Ht 5' 7\" (1.702 m)   Wt 137 lb (62.1 kg)   BMI 21.46 kg/m²       Objective:   Physical Exam  Constitutional:       Appearance: She is well-developed. HENT:      Head: Normocephalic and atraumatic.    Eyes:      Pupils: Pupils are equal, round, and reactive to light.   Neck:      Musculoskeletal: Normal range of motion and neck supple. Thyroid: No thyromegaly. Cardiovascular:      Rate and Rhythm: Normal rate and regular rhythm. Heart sounds: Normal heart sounds. No murmur. Pulmonary:      Effort: Pulmonary effort is normal.      Breath sounds: Normal breath sounds. No wheezing. Assessment:       Diagnosis Orders   1. Benign essential hypertension  BASIC METABOLIC PANEL          Plan:      # HTN. Well controlled. On Lisinopril.   RTO 3 m        Richard Johnosn MD

## 2020-10-26 DIAGNOSIS — I10 BENIGN ESSENTIAL HYPERTENSION: ICD-10-CM

## 2020-10-26 LAB
ANION GAP SERPL CALCULATED.3IONS-SCNC: 10 MMOL/L (ref 3–16)
BUN BLDV-MCNC: 10 MG/DL (ref 7–20)
CALCIUM SERPL-MCNC: 9.5 MG/DL (ref 8.3–10.6)
CHLORIDE BLD-SCNC: 97 MMOL/L (ref 99–110)
CO2: 29 MMOL/L (ref 21–32)
CREAT SERPL-MCNC: 0.9 MG/DL (ref 0.6–1.2)
GFR AFRICAN AMERICAN: >60
GFR NON-AFRICAN AMERICAN: 60
GLUCOSE BLD-MCNC: 93 MG/DL (ref 70–99)
POTASSIUM SERPL-SCNC: 4.5 MMOL/L (ref 3.5–5.1)
SODIUM BLD-SCNC: 136 MMOL/L (ref 136–145)

## 2020-11-23 ENCOUNTER — OFFICE VISIT (OUTPATIENT)
Dept: INTERNAL MEDICINE CLINIC | Age: 80
End: 2020-11-23

## 2020-11-23 VITALS
DIASTOLIC BLOOD PRESSURE: 80 MMHG | TEMPERATURE: 98.6 F | SYSTOLIC BLOOD PRESSURE: 138 MMHG | RESPIRATION RATE: 12 BRPM | HEIGHT: 67 IN | BODY MASS INDEX: 21.35 KG/M2 | HEART RATE: 70 BPM | WEIGHT: 136 LBS

## 2020-11-23 PROCEDURE — 99214 OFFICE O/P EST MOD 30 MIN: CPT | Performed by: INTERNAL MEDICINE

## 2020-11-23 RX ORDER — LISINOPRIL 20 MG/1
20 TABLET ORAL DAILY
Qty: 90 TABLET | Refills: 0 | Status: SHIPPED | OUTPATIENT
Start: 2020-11-23 | End: 2021-03-31

## 2020-11-23 RX ORDER — METOPROLOL TARTRATE 50 MG/1
50 TABLET, FILM COATED ORAL 2 TIMES DAILY
Qty: 180 TABLET | Refills: 3 | Status: SHIPPED | OUTPATIENT
Start: 2020-11-23 | End: 2022-02-14 | Stop reason: SDUPTHER

## 2020-11-23 ASSESSMENT — ENCOUNTER SYMPTOMS
RHINORRHEA: 0
VOMITING: 0
ABDOMINAL PAIN: 0
SHORTNESS OF BREATH: 0
WHEEZING: 0
NAUSEA: 0

## 2020-11-23 NOTE — PROGRESS NOTES
tablet Take 81 mg by mouth daily. No current facility-administered medications for this visit. There are no changes to past medical history, family history, social history or review of systems(except as noted in the history section) since prior note (all reviewed with patient). /80 (Site: Right Upper Arm, Position: Sitting, Cuff Size: Medium Adult)   Pulse 70   Temp 98.6 °F (37 °C)   Resp 12   Ht 5' 7\" (1.702 m)   Wt 136 lb (61.7 kg)   BMI 21.30 kg/m²        Objective:   Physical Exam   Constitutional: She is oriented to person, place, and time. She appears well-developed and well-nourished. HENT:   Head: Normocephalic. Eyes: Pupils are equal, round, and reactive to light. Conjunctivae and EOM are normal.   Neck: Trachea normal and normal range of motion. Neck supple. No JVD present. Carotid bruit is not present. No thyroid mass and no thyromegaly present. Cardiovascular: Normal rate, regular rhythm and normal heart sounds. Exam reveals no gallop. No murmur heard. Pulmonary/Chest: Effort normal and breath sounds normal. No respiratory distress. She has no wheezes. She has no rales. Abdominal: Soft. Bowel sounds are normal. She exhibits no distension and no mass. There is no splenomegaly or hepatomegaly. There is no abdominal tenderness. Musculoskeletal:         General: No edema. Comments: Dupuytren's contracture both hands   Lymphadenopathy:     She has no cervical adenopathy. Neurological: She is alert and oriented to person, place, and time. Skin: Skin is warm and dry. No rash noted. Psychiatric: She has a normal mood and affect. Her behavior is normal. Judgment and thought content normal.       Assessment:       Diagnosis Orders   1. Benign essential hypertension     2. Hyperlipidemia, unspecified hyperlipidemia type     3. Acquired hypothyroidism     4.  Osteoporosis without current pathological fracture, unspecified osteoporosis type            Plan: HTN: on Metoprolol and Lisinopril. Increase Lisinopril to 20 mg daily. Hypothyroidism:  Patient's hypothyroidism is stable on replacement therapy. Hyperlipidemia:  At goal. On Zocor. 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 Osteoporosis-  On calcium with vitamin D. Refuses other treatment. Continue exercise. Decrease calorie intake. The current medical regimen is effective;  continue present plan and medications. See orders.

## 2020-12-08 RX ORDER — AMLODIPINE BESYLATE 5 MG/1
TABLET ORAL
Qty: 90 TABLET | Refills: 0 | Status: SHIPPED | OUTPATIENT
Start: 2020-12-08 | End: 2022-02-14 | Stop reason: SDUPTHER

## 2020-12-09 ENCOUNTER — TELEPHONE (OUTPATIENT)
Dept: INTERNAL MEDICINE CLINIC | Age: 80
End: 2020-12-09

## 2020-12-09 NOTE — TELEPHONE ENCOUNTER
----- Message from Radha Davis sent at 12/9/2020  8:32 AM EST -----  Contact: Pt- [phone]  Pt called stating her daughter from [place] came down last week to visit her and called her yesterday to tell her she tested positive for Covid. The pt currently doesn't have any symptoms except a headache. Pt wants to know if you will put in an order for a Covid test for her. Pt was given the Covid testing phone number.       Pt- [phone]

## 2021-01-22 ENCOUNTER — IMMUNIZATION (OUTPATIENT)
Dept: PRIMARY CARE CLINIC | Age: 81
End: 2021-01-22
Payer: COMMERCIAL

## 2021-01-22 PROCEDURE — 0011A PR IMM ADMN SARSCOV2 100 MCG/0.5 ML 1ST DOSE: CPT | Performed by: FAMILY MEDICINE

## 2021-01-22 PROCEDURE — 91301 COVID-19, MODERNA VACCINE 100MCG/0.5ML DOSE: CPT | Performed by: FAMILY MEDICINE

## 2021-02-19 ENCOUNTER — IMMUNIZATION (OUTPATIENT)
Dept: PRIMARY CARE CLINIC | Age: 81
End: 2021-02-19
Payer: COMMERCIAL

## 2021-02-19 PROCEDURE — 91301 COVID-19, MODERNA VACCINE 100MCG/0.5ML DOSE: CPT | Performed by: FAMILY MEDICINE

## 2021-02-19 PROCEDURE — 0012A PR IMM ADMN SARSCOV2 100 MCG/0.5 ML 2ND DOSE: CPT | Performed by: FAMILY MEDICINE

## 2021-03-31 RX ORDER — LISINOPRIL 20 MG/1
20 TABLET ORAL DAILY
Qty: 90 TABLET | Refills: 0 | Status: SHIPPED | OUTPATIENT
Start: 2021-03-31 | End: 2021-06-18

## 2021-04-05 RX ORDER — SIMVASTATIN 20 MG
TABLET ORAL
Qty: 90 TABLET | Refills: 0 | Status: SHIPPED | OUTPATIENT
Start: 2021-04-05 | End: 2021-07-01

## 2021-04-19 RX ORDER — LEVOTHYROXINE SODIUM 0.07 MG/1
TABLET ORAL
Qty: 90 TABLET | Refills: 0 | Status: SHIPPED | OUTPATIENT
Start: 2021-04-19 | End: 2021-07-13

## 2021-06-18 RX ORDER — LISINOPRIL 20 MG/1
20 TABLET ORAL DAILY
Qty: 90 TABLET | Refills: 0 | Status: SHIPPED | OUTPATIENT
Start: 2021-06-18 | End: 2021-09-20

## 2021-07-01 RX ORDER — SIMVASTATIN 20 MG
TABLET ORAL
Qty: 90 TABLET | Refills: 0 | Status: SHIPPED | OUTPATIENT
Start: 2021-07-01 | End: 2021-09-22

## 2021-07-06 ENCOUNTER — OFFICE VISIT (OUTPATIENT)
Dept: INTERNAL MEDICINE CLINIC | Age: 81
End: 2021-07-06

## 2021-07-06 VITALS
WEIGHT: 133 LBS | SYSTOLIC BLOOD PRESSURE: 118 MMHG | DIASTOLIC BLOOD PRESSURE: 68 MMHG | RESPIRATION RATE: 12 BRPM | HEIGHT: 67 IN | BODY MASS INDEX: 20.88 KG/M2 | HEART RATE: 72 BPM

## 2021-07-06 DIAGNOSIS — M81.0 OSTEOPOROSIS WITHOUT CURRENT PATHOLOGICAL FRACTURE, UNSPECIFIED OSTEOPOROSIS TYPE: ICD-10-CM

## 2021-07-06 DIAGNOSIS — H34.11 CENTRAL RETINAL ARTERY OCCLUSION OF RIGHT EYE: ICD-10-CM

## 2021-07-06 DIAGNOSIS — I10 BENIGN ESSENTIAL HYPERTENSION: Primary | ICD-10-CM

## 2021-07-06 DIAGNOSIS — E03.9 ACQUIRED HYPOTHYROIDISM: ICD-10-CM

## 2021-07-06 DIAGNOSIS — E78.5 HYPERLIPIDEMIA, UNSPECIFIED HYPERLIPIDEMIA TYPE: ICD-10-CM

## 2021-07-06 PROCEDURE — 99214 OFFICE O/P EST MOD 30 MIN: CPT | Performed by: INTERNAL MEDICINE

## 2021-07-06 ASSESSMENT — ENCOUNTER SYMPTOMS
SHORTNESS OF BREATH: 0
VOMITING: 0
WHEEZING: 0
ABDOMINAL PAIN: 0
RHINORRHEA: 0
NAUSEA: 0

## 2021-07-06 ASSESSMENT — PATIENT HEALTH QUESTIONNAIRE - PHQ9
2. FEELING DOWN, DEPRESSED OR HOPELESS: 0
SUM OF ALL RESPONSES TO PHQ QUESTIONS 1-9: 0
SUM OF ALL RESPONSES TO PHQ QUESTIONS 1-9: 0
1. LITTLE INTEREST OR PLEASURE IN DOING THINGS: 0
SUM OF ALL RESPONSES TO PHQ9 QUESTIONS 1 & 2: 0
SUM OF ALL RESPONSES TO PHQ QUESTIONS 1-9: 0

## 2021-07-06 NOTE — PROGRESS NOTES
Subjective:      Patient ID: Anastasiia Lucas is a 78 y.o. female. HPI  Patient is here for follow up of hypertension, hyperlipidemia, hypothyroidism, central retinal artery occlusion and osteoporosis. Patient's BP is controlled on current medications. No CP or dyspnea. No leg edema. Her cholesterol is at goal. No myalgias. Patient's hypothyroidism is stable on replacement therapy. No diarrhea. She has mild constipation. She takes Metamucil. She has hemorrhoids that are acting up. Her vision is unchanged. 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 She has osteoporosis. She is taking Calcium. No fracture issues. Is refusing any other treatment. Review of Systems   Constitutional: Negative for appetite change and fatigue. HENT: Negative for postnasal drip and rhinorrhea. Respiratory: Negative for shortness of breath and wheezing. Cardiovascular: Negative for chest pain and palpitations. Gastrointestinal: Negative for abdominal pain, nausea and vomiting. Endocrine: Negative for cold intolerance, heat intolerance and polyphagia. Musculoskeletal: Negative for joint swelling. Skin: Negative for rash. Neurological: Negative for light-headedness. Psychiatric/Behavioral: Negative for sleep disturbance.      Current Outpatient Medications   Medication Sig Dispense Refill    simvastatin (ZOCOR) 20 MG tablet TAKE 1 TABLET EVERY EVENING 90 tablet 0    lisinopril (PRINIVIL;ZESTRIL) 20 MG tablet TAKE 1 TABLET BY MOUTH DAILY 90 tablet 0    levothyroxine (SYNTHROID) 75 MCG tablet TAKE 1 TABLET BY MOUTH EVERY DAY 90 tablet 0    amLODIPine (NORVASC) 5 MG tablet TAKE 1 TABLET BY MOUTH EVERY DAY 90 tablet 0    metoprolol tartrate (LOPRESSOR) 50 MG tablet Take 1 tablet by mouth 2 times daily 180 tablet 3    calcium carbonate (OYSTER SHELL CALCIUM 500 MG) 1250 (500 Ca) MG tablet Take 1 tablet by mouth daily      vitamin D 1000 units CAPS Take by mouth      NONFORMULARY Indications: Chloracedin  Multiple Vitamin (MULTI-VITAMIN) TABS Take  by mouth.  aspirin 81 MG EC tablet Take 81 mg by mouth daily. No current facility-administered medications for this visit. There are no changes to past medical history, family history, social history or review of systems(except as noted in the history section) since prior note (all reviewed with patient). /68 (Site: Right Upper Arm, Position: Sitting, Cuff Size: Medium Adult)   Pulse 72   Resp 12   Ht 5' 7\" (1.702 m)   Wt 133 lb (60.3 kg)   BMI 20.83 kg/m²        Objective:   Physical Exam  Constitutional:       Appearance: She is well-developed. HENT:      Head: Normocephalic. Eyes:      Conjunctiva/sclera: Conjunctivae normal.      Pupils: Pupils are equal, round, and reactive to light. Neck:      Thyroid: No thyroid mass or thyromegaly. Vascular: No carotid bruit or JVD. Trachea: Trachea normal.   Cardiovascular:      Rate and Rhythm: Normal rate and regular rhythm. Heart sounds: Normal heart sounds. No murmur heard. No gallop. Pulmonary:      Effort: Pulmonary effort is normal. No respiratory distress. Breath sounds: Normal breath sounds. No wheezing or rales. Abdominal:      General: Bowel sounds are normal. There is no distension. Palpations: Abdomen is soft. There is no hepatomegaly, splenomegaly or mass. Tenderness: There is no abdominal tenderness. Musculoskeletal:      Cervical back: Normal range of motion and neck supple. Comments: Dupuytren's contracture both hands   Lymphadenopathy:      Cervical: No cervical adenopathy. Skin:     General: Skin is warm and dry. Findings: No rash. Neurological:      Mental Status: She is alert and oriented to person, place, and time. Psychiatric:         Behavior: Behavior normal.         Thought Content: Thought content normal.         Judgment: Judgment normal.         Assessment:       Diagnosis Orders   1.  Benign essential hypertension  Comprehensive Metabolic Panel    CBC Auto Differential    Uric Acid   2. Acquired hypothyroidism  TSH without Reflex   3. Hyperlipidemia, unspecified hyperlipidemia type     4. Osteoporosis without current pathological fracture, unspecified osteoporosis type     5. Central retinal artery occlusion of right eye            Plan:         HTN: on Metoprolol and Lisinopril. Increase Lisinopril to 20 mg daily. Hypothyroidism:  Patient's hypothyroidism is stable on replacement therapy. Hyperlipidemia:  At goal. On Zocor. 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 Osteoporosis-  On calcium with vitamin D. Refuses other treatment. Continue exercise. Decrease calorie intake. The current medical regimen is effective;  continue present plan and medications. See orders.

## 2021-07-13 RX ORDER — LEVOTHYROXINE SODIUM 0.07 MG/1
TABLET ORAL
Qty: 90 TABLET | Refills: 0 | Status: SHIPPED | OUTPATIENT
Start: 2021-07-13 | End: 2021-09-22

## 2021-07-29 DIAGNOSIS — E03.9 ACQUIRED HYPOTHYROIDISM: ICD-10-CM

## 2021-07-29 DIAGNOSIS — I10 BENIGN ESSENTIAL HYPERTENSION: ICD-10-CM

## 2021-07-29 LAB
A/G RATIO: 1.4 (ref 1.1–2.2)
ALBUMIN SERPL-MCNC: 4.4 G/DL (ref 3.4–5)
ALP BLD-CCNC: 51 U/L (ref 40–129)
ALT SERPL-CCNC: 6 U/L (ref 10–40)
ANION GAP SERPL CALCULATED.3IONS-SCNC: 14 MMOL/L (ref 3–16)
AST SERPL-CCNC: 34 U/L (ref 15–37)
BASOPHILS ABSOLUTE: 0 K/UL (ref 0–0.2)
BASOPHILS RELATIVE PERCENT: 0.4 %
BILIRUB SERPL-MCNC: 0.4 MG/DL (ref 0–1)
BUN BLDV-MCNC: 9 MG/DL (ref 7–20)
CALCIUM SERPL-MCNC: 9.6 MG/DL (ref 8.3–10.6)
CHLORIDE BLD-SCNC: 90 MMOL/L (ref 99–110)
CO2: 26 MMOL/L (ref 21–32)
CREAT SERPL-MCNC: 1 MG/DL (ref 0.6–1.2)
EOSINOPHILS ABSOLUTE: 0.1 K/UL (ref 0–0.6)
EOSINOPHILS RELATIVE PERCENT: 2.6 %
GFR AFRICAN AMERICAN: >60
GFR NON-AFRICAN AMERICAN: 53
GLOBULIN: 3.2 G/DL
GLUCOSE BLD-MCNC: 68 MG/DL (ref 70–99)
HCT VFR BLD CALC: 40.6 % (ref 36–48)
HEMOGLOBIN: 13.7 G/DL (ref 12–16)
LYMPHOCYTES ABSOLUTE: 1.4 K/UL (ref 1–5.1)
LYMPHOCYTES RELATIVE PERCENT: 25.6 %
MCH RBC QN AUTO: 29.4 PG (ref 26–34)
MCHC RBC AUTO-ENTMCNC: 33.7 G/DL (ref 31–36)
MCV RBC AUTO: 87.3 FL (ref 80–100)
MONOCYTES ABSOLUTE: 0.8 K/UL (ref 0–1.3)
MONOCYTES RELATIVE PERCENT: 14.7 %
NEUTROPHILS ABSOLUTE: 3 K/UL (ref 1.7–7.7)
NEUTROPHILS RELATIVE PERCENT: 56.7 %
PDW BLD-RTO: 13.9 % (ref 12.4–15.4)
PLATELET # BLD: 266 K/UL (ref 135–450)
PMV BLD AUTO: 8.9 FL (ref 5–10.5)
POTASSIUM SERPL-SCNC: 5.1 MMOL/L (ref 3.5–5.1)
RBC # BLD: 4.66 M/UL (ref 4–5.2)
SODIUM BLD-SCNC: 130 MMOL/L (ref 136–145)
TOTAL PROTEIN: 7.6 G/DL (ref 6.4–8.2)
TSH SERPL DL<=0.05 MIU/L-ACNC: 0.99 UIU/ML (ref 0.27–4.2)
URIC ACID, SERUM: 4.9 MG/DL (ref 2.6–6)
WBC # BLD: 5.3 K/UL (ref 4–11)

## 2021-08-03 DIAGNOSIS — E87.1 HYPONATREMIA: ICD-10-CM

## 2021-08-03 DIAGNOSIS — E87.1 HYPONATREMIA: Primary | ICD-10-CM

## 2021-08-03 LAB
ANION GAP SERPL CALCULATED.3IONS-SCNC: 15 MMOL/L (ref 3–16)
BUN BLDV-MCNC: 8 MG/DL (ref 7–20)
CALCIUM SERPL-MCNC: 9.6 MG/DL (ref 8.3–10.6)
CHLORIDE BLD-SCNC: 95 MMOL/L (ref 99–110)
CO2: 24 MMOL/L (ref 21–32)
CREAT SERPL-MCNC: 0.9 MG/DL (ref 0.6–1.2)
GFR AFRICAN AMERICAN: >60
GFR NON-AFRICAN AMERICAN: 60
GLUCOSE BLD-MCNC: 93 MG/DL (ref 70–99)
POTASSIUM SERPL-SCNC: 5.2 MMOL/L (ref 3.5–5.1)
SODIUM BLD-SCNC: 134 MMOL/L (ref 136–145)

## 2021-09-20 RX ORDER — LISINOPRIL 20 MG/1
20 TABLET ORAL DAILY
Qty: 90 TABLET | Refills: 0 | Status: SHIPPED | OUTPATIENT
Start: 2021-09-20 | End: 2021-12-09 | Stop reason: SDUPTHER

## 2021-09-22 ENCOUNTER — NURSE ONLY (OUTPATIENT)
Dept: INTERNAL MEDICINE CLINIC | Age: 81
End: 2021-09-22

## 2021-09-22 DIAGNOSIS — Z23 NEED FOR INFLUENZA VACCINATION: Primary | ICD-10-CM

## 2021-09-22 PROCEDURE — G0008 ADMIN INFLUENZA VIRUS VAC: HCPCS | Performed by: INTERNAL MEDICINE

## 2021-09-22 PROCEDURE — 90662 IIV NO PRSV INCREASED AG IM: CPT | Performed by: INTERNAL MEDICINE

## 2021-09-22 RX ORDER — LEVOTHYROXINE SODIUM 0.07 MG/1
TABLET ORAL
Qty: 90 TABLET | Refills: 0 | Status: SHIPPED | OUTPATIENT
Start: 2021-09-22 | End: 2021-12-09 | Stop reason: SDUPTHER

## 2021-09-22 RX ORDER — SIMVASTATIN 20 MG
TABLET ORAL
Qty: 90 TABLET | Refills: 0 | Status: SHIPPED | OUTPATIENT
Start: 2021-09-22 | End: 2021-12-09 | Stop reason: SDUPTHER

## 2021-12-09 ENCOUNTER — OFFICE VISIT (OUTPATIENT)
Dept: INTERNAL MEDICINE CLINIC | Age: 81
End: 2021-12-09

## 2021-12-09 VITALS
DIASTOLIC BLOOD PRESSURE: 80 MMHG | RESPIRATION RATE: 12 BRPM | HEART RATE: 70 BPM | WEIGHT: 128 LBS | BODY MASS INDEX: 20.09 KG/M2 | SYSTOLIC BLOOD PRESSURE: 120 MMHG | HEIGHT: 67 IN

## 2021-12-09 DIAGNOSIS — E78.5 HYPERLIPIDEMIA, UNSPECIFIED HYPERLIPIDEMIA TYPE: ICD-10-CM

## 2021-12-09 DIAGNOSIS — M72.0 DUPUYTREN'S CONTRACTURE OF BOTH HANDS: ICD-10-CM

## 2021-12-09 DIAGNOSIS — I10 BENIGN ESSENTIAL HYPERTENSION: ICD-10-CM

## 2021-12-09 DIAGNOSIS — H34.11 CENTRAL RETINAL ARTERY OCCLUSION OF RIGHT EYE: ICD-10-CM

## 2021-12-09 DIAGNOSIS — Z00.00 ROUTINE GENERAL MEDICAL EXAMINATION AT A HEALTH CARE FACILITY: ICD-10-CM

## 2021-12-09 DIAGNOSIS — E03.9 ACQUIRED HYPOTHYROIDISM: ICD-10-CM

## 2021-12-09 DIAGNOSIS — M81.0 OSTEOPOROSIS WITHOUT CURRENT PATHOLOGICAL FRACTURE, UNSPECIFIED OSTEOPOROSIS TYPE: ICD-10-CM

## 2021-12-09 DIAGNOSIS — Z00.00 MEDICARE ANNUAL WELLNESS VISIT, SUBSEQUENT: Primary | ICD-10-CM

## 2021-12-09 LAB
A/G RATIO: 1.5 (ref 1.1–2.2)
ALBUMIN SERPL-MCNC: 4.5 G/DL (ref 3.4–5)
ALP BLD-CCNC: 47 U/L (ref 40–129)
ALT SERPL-CCNC: 10 U/L (ref 10–40)
ANION GAP SERPL CALCULATED.3IONS-SCNC: 14 MMOL/L (ref 3–16)
AST SERPL-CCNC: 17 U/L (ref 15–37)
BASOPHILS ABSOLUTE: 0 K/UL (ref 0–0.2)
BASOPHILS RELATIVE PERCENT: 0.5 %
BILIRUB SERPL-MCNC: 0.3 MG/DL (ref 0–1)
BUN BLDV-MCNC: 8 MG/DL (ref 7–20)
CALCIUM SERPL-MCNC: 9.7 MG/DL (ref 8.3–10.6)
CHLORIDE BLD-SCNC: 92 MMOL/L (ref 99–110)
CHOLESTEROL, TOTAL: 161 MG/DL (ref 0–199)
CO2: 25 MMOL/L (ref 21–32)
CREAT SERPL-MCNC: 0.9 MG/DL (ref 0.6–1.2)
EOSINOPHILS ABSOLUTE: 0.1 K/UL (ref 0–0.6)
EOSINOPHILS RELATIVE PERCENT: 1.8 %
GFR AFRICAN AMERICAN: >60
GFR NON-AFRICAN AMERICAN: 60
GLUCOSE BLD-MCNC: 95 MG/DL (ref 70–99)
HCT VFR BLD CALC: 41.1 % (ref 36–48)
HDLC SERPL-MCNC: 65 MG/DL (ref 40–60)
HEMOGLOBIN: 13.2 G/DL (ref 12–16)
LDL CHOLESTEROL CALCULATED: 76 MG/DL
LYMPHOCYTES ABSOLUTE: 1.3 K/UL (ref 1–5.1)
LYMPHOCYTES RELATIVE PERCENT: 27.2 %
MCH RBC QN AUTO: 28 PG (ref 26–34)
MCHC RBC AUTO-ENTMCNC: 32.1 G/DL (ref 31–36)
MCV RBC AUTO: 87.3 FL (ref 80–100)
MONOCYTES ABSOLUTE: 0.6 K/UL (ref 0–1.3)
MONOCYTES RELATIVE PERCENT: 12.1 %
NEUTROPHILS ABSOLUTE: 2.8 K/UL (ref 1.7–7.7)
NEUTROPHILS RELATIVE PERCENT: 58.4 %
PDW BLD-RTO: 14.1 % (ref 12.4–15.4)
PLATELET # BLD: 273 K/UL (ref 135–450)
PMV BLD AUTO: 8.9 FL (ref 5–10.5)
POTASSIUM SERPL-SCNC: 5.1 MMOL/L (ref 3.5–5.1)
RBC # BLD: 4.71 M/UL (ref 4–5.2)
SODIUM BLD-SCNC: 131 MMOL/L (ref 136–145)
TOTAL PROTEIN: 7.5 G/DL (ref 6.4–8.2)
TRIGL SERPL-MCNC: 102 MG/DL (ref 0–150)
TSH SERPL DL<=0.05 MIU/L-ACNC: 1.43 UIU/ML (ref 0.27–4.2)
URIC ACID, SERUM: 4.3 MG/DL (ref 2.6–6)
VLDLC SERPL CALC-MCNC: 20 MG/DL
WBC # BLD: 4.8 K/UL (ref 4–11)

## 2021-12-09 PROCEDURE — G0439 PPPS, SUBSEQ VISIT: HCPCS | Performed by: INTERNAL MEDICINE

## 2021-12-09 RX ORDER — LEVOTHYROXINE SODIUM 0.07 MG/1
TABLET ORAL
Qty: 90 TABLET | Refills: 0 | Status: SHIPPED | OUTPATIENT
Start: 2021-12-09 | End: 2021-12-23

## 2021-12-09 RX ORDER — SIMVASTATIN 20 MG
TABLET ORAL
Qty: 90 TABLET | Refills: 0 | Status: SHIPPED | OUTPATIENT
Start: 2021-12-09 | End: 2022-02-14 | Stop reason: SDUPTHER

## 2021-12-09 RX ORDER — LISINOPRIL 20 MG/1
20 TABLET ORAL DAILY
Qty: 90 TABLET | Refills: 0 | Status: SHIPPED | OUTPATIENT
Start: 2021-12-09 | End: 2021-12-23

## 2021-12-09 ASSESSMENT — PATIENT HEALTH QUESTIONNAIRE - PHQ9
SUM OF ALL RESPONSES TO PHQ QUESTIONS 1-9: 0
SUM OF ALL RESPONSES TO PHQ QUESTIONS 1-9: 0
SUM OF ALL RESPONSES TO PHQ9 QUESTIONS 1 & 2: 0
SUM OF ALL RESPONSES TO PHQ QUESTIONS 1-9: 0
1. LITTLE INTEREST OR PLEASURE IN DOING THINGS: 0
2. FEELING DOWN, DEPRESSED OR HOPELESS: 0

## 2021-12-09 ASSESSMENT — LIFESTYLE VARIABLES: HOW OFTEN DO YOU HAVE A DRINK CONTAINING ALCOHOL: 0

## 2021-12-09 NOTE — PROGRESS NOTES
Medicare Annual Wellness Visit  Name: Chirag Holliday Date: 2021   MRN: [id number] Sex: Female   Age: 78 y.o. Ethnicity: Non- / Non    : 1940 Race: White (non-)      Rhea Ponce is here for Medicare AWV    Screenings for behavioral, psychosocial and functional/safety risks, and cognitive dysfunction are all negative except as indicated below. These results, as well as other patient data from the [address] form, are documented in Flowsheets linked to this Encounter. Patient is here for follow up of hypertension, hyperlipidemia, hypothyroidism, central retinal artery occlusion and osteoporosis. Patient's BP is controlled on current medications. No CP or dyspnea. No leg edema. Her cholesterol is at goal. No myalgias. Patient's hypothyroidism is stable on replacement therapy. No diarrhea. She has mild constipation. She takes fiber. It helps. She has hemorrhoids that are acting up. Her vision is unchanged. 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 She has osteoporosis. She is taking Calcium. No fracture issues. Is refusing any other treatment. Allergies   Allergen Reactions    Latex      Skin irritation when wears gloves often. Prior to Visit Medications    Medication Sig Taking?  Authorizing Provider   levothyroxine (SYNTHROID) 75 MCG tablet TAKE 1 TABLET BY MOUTH EVERY DAY Yes Severiano Sullivan MD   simvastatin (ZOCOR) 20 MG tablet TAKE 1 TABLET BY MOUTH EVERY EVENING Yes Severiano Sullivan MD   lisinopril (PRINIVIL;ZESTRIL) 20 MG tablet Take 1 tablet by mouth daily Yes Severiano Sullivan MD   amLODIPine (NORVASC) 5 MG tablet TAKE 1 TABLET BY MOUTH EVERY DAY  Severiano Sullivan MD   metoprolol tartrate (LOPRESSOR) 50 MG tablet Take 1 tablet by mouth 2 times daily  Severiano Sullivan MD   calcium carbonate (OYSTER SHELL CALCIUM 500 MG) 1250 (500 Ca) MG tablet Take 1 tablet by mouth daily  Historical Provider, MD   vitamin D 1000 units CAPS Take by mouth  Historical Provider, MD   NONFORMULARY Indications: Chloracedin  Historical Provider, MD   Multiple Vitamin (MULTI-VITAMIN) TABS Take  by mouth. Historical Provider, MD   aspirin 81 MG EC tablet Take 81 mg by mouth daily. Historical Provider, MD         Past Medical History:   Diagnosis Date    Benign essential hypertension 11/15/2017    Central retinal artery occlusion     right eye x3    Dupuytren's contracture of both hands 3/14/2014    Hyperlipidemia     Hypothyroidism     Mitral valve disease     Osteoporosis     Osteoporosis without current pathological fracture 8/9/2017    Other musculoskeletal symptoms referable to limbs(729.89)     Trigger finger (acquired)        Past Surgical History:   Procedure Laterality Date    COLONOSCOPY  1/11/2006    COLONOSCOPY N/A 7/8/2020    COLON W/ANES. (10:00) performed by Miguel Angel Ramirez MD at [address] [place] [address]  7/22/11    cataract left eye    HAND SURGERY Left 09/28/2017    Left Dupuytrens disease excision from thumb and index finger    HYSTERECTOMY           Family History   Problem Relation Age of Onset    Breast Cancer Sister     Cancer Sister         Thyroid/Uterine    Diabetes Sister     Thyroid Disease Sister        CareTeam (Including outside providers/suppliers regularly involved in providing care):   Patient Care Team:  Ashley Ta MD as PCP - Paula Rowe MD as PCP - [facility][1] Empaneled Provider  Dottie Shelley MD as Surgeon (Orthopedic Surgery)    Wt Readings from Last 3 Encounters:   12/09/21 128 lb (58.1 kg)   07/06/21 133 lb (60.3 kg)   11/23/20 136 lb (61.7 kg)     Vitals:    12/09/21 0927   BP: 120/80   Site: Right Upper Arm   Position: Sitting   Cuff Size: Medium Adult   Pulse: 70   Resp: 12   Weight: 128 lb (58.1 kg)   Height: 5' 7\" (1.702 m)     Body mass index is 20.05 kg/m².     Based upon direct observation of the patient, evaluation of cognition reveals recent and remote memory intact. General Appearance: alert and oriented to person, place and time, well developed and well- nourished, in no acute distress  Skin: warm and dry, no rash or erythema  Head: normocephalic and atraumatic  Eyes: pupils equal, round, and reactive to light, extraocular eye movements intact, conjunctivae normal  ENT: tympanic membrane, external ear and ear canal normal bilaterally, nose without deformity, nasal mucosa and turbinates normal without polyps  Neck: supple and non-tender without mass, no thyromegaly or thyroid nodules, no cervical lymphadenopathy  Pulmonary/Chest: clear to auscultation bilaterally- no wheezes, rales or rhonchi, normal air movement, no respiratory distress  Cardiovascular: normal rate, regular rhythm, normal S1 and S2, no murmurs, rubs, clicks, or gallops, distal pulses intact, no carotid bruits  Abdomen: soft, non-tender, non-distended, normal bowel sounds, no masses or organomegaly  Extremities: no cyanosis, clubbing or edema  Musculoskeletal: normal range of motion, no joint swelling, deformity or tenderness  Neurologic: reflexes normal and symmetric, no cranial nerve deficit, gait, coordination and speech normal    Patient's complete Health Risk Assessment and screening values have been reviewed and are found in Flowsheets. The following problems were reviewed today and where indicated follow up appointments were made and/or referrals ordered. Positive Risk Factor Screenings with Interventions:            General Health and ACP:  General  In general, how would you say your health is?: Excellent  In the past 7 days, have you experienced any of the following?  New or Increased Pain, New or Increased Fatigue, Loneliness, Social Isolation, Stress or Anger?: None of These  Do you get the social and emotional support that you need?: Yes  Do you have a Living Will?: Yes  Advance Directives     Power of [address] [place] [address] Will ACP-Advance Directive ACP-Power of     Not on File Not on File Not on File Not on File      General Health Risk Interventions:  · will get a copy of living will     Hearing/Vision:  No exam data present  Hearing/Vision  Do you or your family notice any trouble with your hearing that hasn't been managed with hearing aids?: No  Do you have difficulty driving, watching TV, or doing any of your daily activities because of your eyesight?: (!) Yes  Have you had an eye exam within the past year?: Yes  Hearing/Vision Interventions:  · Vision concerns:  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      Personalized Preventive Plan   Current Health Maintenance Status  Immunization History   Administered Date(s) Administered    COVID-19, Ladene Jennie, Primary or Immunocompromised, PF, 100mcg/0.5mL 01/22/2021, 02/19/2021    Influenza 10/17/2012, 10/07/2013    Influenza A (L2P7-13) Vaccine IM 01/18/2010    Influenza Virus Vaccine 10/27/2014, 12/01/2015    Influenza Whole 10/15/2010    Influenza, High Dose (Fluzone 65 yrs and older) 10/10/2016, 11/15/2017    Influenza, High-dose, Quadv, 65 yrs +, IM (Fluzone) 09/22/2021    Influenza, Quadv, Recombinant, IM PF (Flublok 18 yrs and older) 10/16/2019    Pneumococcal Conjugate 13-valent ([id number]) 12/01/2015    Pneumococcal Polysaccharide ([id number]) 10/17/2012    Zoster Live (Zostavax) 11/12/2014        Health Maintenance   Topic Date Due    DTaP/Tdap/Td vaccine (1 - Tdap) Never done    Shingles Vaccine (2 of 3) 01/07/2015    Lipid screen  02/21/2021    COVID-19 Vaccine (3 - Booster for Ladene Jennie series) 08/19/2021    Annual Wellness Visit (AWV)  08/22/2021    TSH testing  07/29/2022    Potassium monitoring  08/03/2022    Creatinine monitoring  08/03/2022    DEXA (modify frequency per FRAX score)  Completed    Flu vaccine  Completed    Pneumococcal 65+ years Vaccine  Completed    Hepatitis A vaccine  Aged Out    Hepatitis B vaccine  Aged Out    Hib vaccine  Aged Out  Meningococcal (ACWY) vaccine  Aged Out     Recommendations for Preventive Services Due: see orders and patient instructions/AVS.  . Recommended screening schedule for the next 5-10 years is provided to the patient in written form: see Patient Instructions/AVS.    Holly Santa was seen today for medicare awv. Diagnoses and all orders for this visit:    Medicare annual wellness visit, subsequent    Acquired hypothyroidism  -     TSH without Reflex; Future    Central retinal artery occlusion of right eye    Hyperlipidemia, unspecified hyperlipidemia type    Dupuytren's contracture of both hands    Benign essential hypertension  -     Comprehensive Metabolic Panel; Future  -     CBC Auto Differential; Future  -     Uric Acid; Future  -     Lipid Panel; Future    Osteoporosis without current pathological fracture, unspecified osteoporosis type    Other orders  -     levothyroxine (SYNTHROID) 75 MCG tablet; TAKE 1 TABLET BY MOUTH EVERY DAY  -     simvastatin (ZOCOR) 20 MG tablet; TAKE 1 TABLET BY MOUTH EVERY EVENING  -     lisinopril (PRINIVIL;ZESTRIL) 20 MG tablet;  Take 1 tablet by mouth daily

## 2021-12-09 NOTE — PATIENT INSTRUCTIONS
Personalized Preventive Plan for Charyl Meckel - 12/9/2021  Medicare offers a range of preventive health benefits. Some of the tests and screenings are paid in full while other may be subject to a deductible, co-insurance, and/or copay. Some of these benefits include a comprehensive review of your medical history including lifestyle, illnesses that may run in your family, and various assessments and screenings as appropriate. After reviewing your medical record and screening and assessments performed today your provider may have ordered immunizations, labs, imaging, and/or referrals for you. A list of these orders (if applicable) as well as your Preventive Care list are included within your After Visit Summary for your review. Other Preventive Recommendations:    · A preventive eye exam performed by an eye specialist is recommended every 1-2 years to screen for glaucoma; cataracts, macular degeneration, and other eye disorders. · A preventive dental visit is recommended every 6 months. · Try to get at least 150 minutes of exercise per week or 10,000 steps per day on a pedometer . · Order or download the FREE \"Exercise & Physical Activity: Your Everyday Guide\" from [employer]. Call 7-720.685.9502 or search [employer] online. · You need 3415-5789 mg of calcium and 2320-9570 IU of vitamin D per day. It is possible to meet your calcium requirement with diet alone, but a vitamin D supplement is usually necessary to meet this goal.  · When exposed to the sun, use a sunscreen that protects against both UVA and UVB radiation with an SPF of 30 or greater. Reapply every 2 to 3 hours or after sweating, drying off with a towel, or swimming. · Always wear a seat belt when traveling in a car. Always wear a helmet when riding a bicycle or motorcycle.     Keep Your Memory [place]       Many factors can affect your ability to remembera hectic lifestyle, aging, stress, chronic disease, and certain medicines. But, there are steps you can take to sharpen your mind and help preserve your memory. Challenge Your Brain   Regularly challenging your mind may help keeps it in top shape. Good mental exercises include:   Crossword puzzlesUse a dictionary if you need it; you will learn more that way. Brainteasers Try some! Crafts, such as wood working and sewing   Hobbies, such as gardening and building model airplanes   SocializingVisit old friends or join groups to meet new ones. Reading   Learning a new language   Taking a class, whether it be art history or jonh chi   TravelingExperience the food, history, and culture of your destination   Learning to use a computer   Going to museums, the theater, or thought-provoking movies   Changing things in your daily life, such as reversing your pattern in the grocery store or brushing your teeth using your nondominant hand   Use Memory Aids   There is no need to remember every detail on your own. These memory aids can help:   Calendars and day planners   Electronic organizers to store all sorts of helpful informationThese devices can \"beep\" to remind you of appointments. A book of days to record birthdays, anniversaries, and other occasions that occur on the same date every year   Detailed \"to-do\" lists and strategically placed sticky notes   Quick \"study\" sessionsBefore a gathering, review who will be there so their names will be fresh in your mind. Establish routinesFor example, keep your keys, wallet, and umbrella in the same place all the time or take medicine with your 8:00 AM glass of juice   Live a Healthy Life   Many actions that will keep your body strong will do the same for your mind. For example:   Talk to Your Doctor About Herbs and Supplements    Malnutrition and vitamin deficiencies can impair your mental function. For example, vitamin B12 deficiency can cause a range of symptoms, including confusion.  But, what if your nutritional needs are being met? Can herbs and supplements still offer a benefit? Researchers have investigated a range of natural remedies, such as ginkgo , ginseng , and the supplement phosphatidylserine (PS). So far, though, the evidence is inconsistent as to whether these products can improve memory or thinking. If you are interested in taking herbs and supplements, talk to your doctor first because they may interact with other medicines that you are taking. Exercise Regularly    Among the many benefits of regular exercise are increased blood flow to the brain and decreased risk of certain diseases that can interfere with memory function. One study found that even moderate exercise has a beneficial effect. Examples of \"moderate\" exercise include:   Playing 18 holes of golf once a week, without a cart   Playing tennis twice a week   Walking one mile per day   Manage Stress    It can be tough to remember what is important when your mind is cluttered. Make time for relaxation. Choose activities that calm you down, and make it routine. Manage Chronic Conditions    Side effects of high blood pressure , diabetes, and heart disease can interfere with mental function. Many of the lifestyle steps discussed here can help manage these conditions. Strive to eat a healthy diet, exercise regularly, get stress under control, and follow your doctor's advice for your condition. Minimize Medications    Talk to your doctor about the medicines that you take. Some may be unnecessary. Also, healthy lifestyle habits may lower the need for certain drugs. Last Reviewed: April 2010 Zach Apple MD   Updated: 4/13/2010   ·     [address]       As we get older, changes in balance, gait, strength, vision, hearing, and cognition make even the most youthful senior more prone to accidents. Falls are one of the leading health risks for older people.  This increased risk of falling is related to:   Aging process (eg, decreased muscle strength, slowed reflexes)   Higher incidence of chronic health problems (eg, arthritis, diabetes) that may limit mobility, agility or sensory awareness   Side effects of medicine (eg, dizziness, blurred vision)especially medicines like prescription pain medicines and drugs used to treat mental health conditions   Depending on the brittleness of your bones, the consequences of a fall can be serious and long lasting. Home Life   Research by the Association of Aging [facility]) shows that some home accidents among older adults can be prevented by making simple lifestyle changes and basic modifications and repairs to the home environment. Here are some lifestyle changes that experts recommend:   Have your hearing and vision checked regularly. Be sure to wear prescription glasses that are right for you. Speak to your doctor or pharmacist about the possible side effects of your medicines. A number of medicines can cause dizziness. If you have problems with sleep, talk to your doctor. Limit your intake of alcohol. If necessary, use a cane or walker to help maintain your balance. Wear supportive, rubber-soled shoes, even at home. If you live in a region that gets wintry weather, you may want to put special cleats on your shoes to prevent you from slipping on the snow and ice. Exercise regularly to help maintain muscle tone, agility, and balance. Always hold the banister when going up or down stairs. Also, use  bars when getting in or out of the bath or shower, or using the toilet. To avoid dizziness, get up slowly from a lying down position. Sit up first, dangling your legs for a minute or two before rising to a standing position. Overall Home Safety Check   According to the Consumer Product Safety Commision's \"Older Consumer Home Safety Checklist,\" it is important to check for potential hazards in each room. And remember, proper lighting is an essential factor in home safety.  If you cannot see clearly, you are more likely to fall. Important questions to ask yourself include:   Are lamp, electric, extension, and telephone cords placed out of the flow of traffic and maintained in good condition? Have frayed cords been replaced? Are all small rugs and runners slip resistant? If not, you can secure them to the floor with a special double-sided carpet tape. Are smoke detectors properly locatedone on every floor of your home and one outside of every sleeping area? Are they in good working order? Are batteries replaced at least once a year? Do you have a well-maintained carbon monoxide detector outside every sleeping are in your home? Does your furniture layout leave plenty of space to maneuver between and around chairs, tables, beds, and sofas? Are hallways, stairs and passages between rooms well lit? Can you reach a lamp without getting out of bed? Are floor surfaces well maintained? Shag rugs, high-pile carpeting, tile floors, and polished wood floors can be particularly slippery. Stairs should always have handrails and be carpeted or fitted with a non-skid tread. Is your telephone easily reachable. Is the cord safely tucked away? Room by Room   According to the Association of Aging, bathrooms and dottie are the two most potentially hazardous rooms in your home. In the Kitchen    Be sure your stove is in proper working order and always make sure burners and the oven are off before you go out or go to sleep. Keep pots on the back burners, turn handles away from the front of the stove, and keep stove clean and free of grease build-up. Kitchen ventilation systems and range exhausts should be working properly. Keep flammable objects such as towels and pot holders away from the cooking area except when in use. Make sure kitchen curtains are tied back. Move cords and appliances away from the sink and hot surfaces.  If extension cords are needed, install wiring guides so S. A.F.E. (Smoke Alarms for Every) [facility], senior citizens are one of the two highest risk groups for death and serious injuries due to residential fires. When cooking, wear short-sleeved items, never a bulky long-sleeved robe. The [facility]'s Safety Checklist for Older Consumers emphasizes the importance of checking basements, garages, workshops and storage areas for fire hazards, such as volatile liquids, piles of old rags or clothing and overloaded circuits. Never smoke in bed or when lying down on a couch or recliner chair. Small portable electric or kerosene heaters are responsible for many home fires and should be used cautiously if at all. If you do use one, be sure to keep them away from flammable materials. In case of fire, make sure you have a pre-established emergency exit plan. Have a professional check your fireplace and other fuel-burning appliances yearly. Helping Hands   Baby boomers entering the monterroso years will continue to see the development of new products to help older adults live safely and independently in spite of age-related changes. Making Life More Livable  , by Tania Welsh, lists over 1,000 products for \"living well in the mature years,\" such as bathing and mobility aids, household security devices, ergonomically designed knives and peelers, and faucet valves and knobs for temperature control. Medical supply stores and organizations are good sources of information about products that improve your quality of life and insure your safety.      Last Reviewed: November 2009 Delano Garcia MD   Updated: 3/7/2011     ·

## 2021-12-10 DIAGNOSIS — E87.1 HYPONATREMIA: Primary | ICD-10-CM

## 2021-12-23 RX ORDER — LEVOTHYROXINE SODIUM 0.07 MG/1
TABLET ORAL
Qty: 90 TABLET | Refills: 0 | Status: SHIPPED | OUTPATIENT
Start: 2021-12-23 | End: 2022-02-14 | Stop reason: SDUPTHER

## 2021-12-23 RX ORDER — LISINOPRIL 20 MG/1
20 TABLET ORAL DAILY
Qty: 90 TABLET | Refills: 0 | Status: SHIPPED | OUTPATIENT
Start: 2021-12-23 | End: 2022-02-14 | Stop reason: SDUPTHER

## 2022-01-10 ENCOUNTER — HOSPITAL ENCOUNTER (OUTPATIENT)
Age: 82
Discharge: HOME | End: 2022-01-10
Payer: COMMERCIAL

## 2022-01-10 DIAGNOSIS — E87.1 HYPONATREMIA: ICD-10-CM

## 2022-01-10 LAB
ANION GAP SERPL CALCULATED.3IONS-SCNC: 11 MMOL/L (ref 3–16)
BUN BLDV-MCNC: 8 MG/DL (ref 7–20)
CALCIUM SERPL-MCNC: 9.3 MG/DL (ref 8.3–10.6)
CHLORIDE BLD-SCNC: 92 MMOL/L (ref 99–110)
CO2: 27 MMOL/L (ref 21–32)
CREAT SERPL-MCNC: 0.9 MG/DL (ref 0.6–1.2)
GFR AFRICAN AMERICAN: >60
GFR NON-AFRICAN AMERICAN: 60
GLUCOSE BLD-MCNC: 104 MG/DL (ref 70–99)
POTASSIUM SERPL-SCNC: 4.9 MMOL/L (ref 3.5–5.1)
SODIUM BLD-SCNC: 130 MMOL/L (ref 136–145)

## 2022-01-10 PROCEDURE — 36415 COLL VENOUS BLD VENIPUNCTURE: CPT

## 2022-01-10 PROCEDURE — 80048 BASIC METABOLIC PNL TOTAL CA: CPT

## 2022-01-11 DIAGNOSIS — E87.1 HYPONATREMIA: Primary | ICD-10-CM

## 2022-01-18 ENCOUNTER — HOSPITAL ENCOUNTER (OUTPATIENT)
Age: 82
Discharge: HOME | End: 2022-01-18
Payer: COMMERCIAL

## 2022-01-18 DIAGNOSIS — E87.1 HYPONATREMIA: ICD-10-CM

## 2022-01-18 LAB
ANION GAP SERPL CALCULATED.3IONS-SCNC: 12 MMOL/L (ref 3–16)
BUN BLDV-MCNC: 10 MG/DL (ref 7–20)
CALCIUM SERPL-MCNC: 9.4 MG/DL (ref 8.3–10.6)
CHLORIDE BLD-SCNC: 94 MMOL/L (ref 99–110)
CO2: 26 MMOL/L (ref 21–32)
CREAT SERPL-MCNC: 0.9 MG/DL (ref 0.6–1.2)
GFR AFRICAN AMERICAN: >60
GFR NON-AFRICAN AMERICAN: 60
GLUCOSE BLD-MCNC: 94 MG/DL (ref 70–99)
POTASSIUM SERPL-SCNC: 4.7 MMOL/L (ref 3.5–5.1)
SODIUM BLD-SCNC: 132 MMOL/L (ref 136–145)

## 2022-01-18 PROCEDURE — 80048 BASIC METABOLIC PNL TOTAL CA: CPT

## 2022-01-18 PROCEDURE — 36415 COLL VENOUS BLD VENIPUNCTURE: CPT

## 2022-02-14 RX ORDER — LISINOPRIL 20 MG/1
20 TABLET ORAL DAILY
Qty: 90 TABLET | Refills: 0 | Status: SHIPPED | OUTPATIENT
Start: 2022-02-14 | End: 2022-04-01 | Stop reason: SDUPTHER

## 2022-02-14 RX ORDER — SIMVASTATIN 20 MG
TABLET ORAL
Qty: 90 TABLET | Refills: 0 | Status: SHIPPED | OUTPATIENT
Start: 2022-02-14 | End: 2022-04-01 | Stop reason: SDUPTHER

## 2022-02-14 RX ORDER — AMLODIPINE BESYLATE 5 MG/1
TABLET ORAL
Qty: 90 TABLET | Refills: 0 | Status: SHIPPED | OUTPATIENT
Start: 2022-02-14 | End: 2022-04-01 | Stop reason: SDUPTHER

## 2022-02-14 RX ORDER — LEVOTHYROXINE SODIUM 0.07 MG/1
TABLET ORAL
Qty: 90 TABLET | Refills: 0 | Status: SHIPPED | OUTPATIENT
Start: 2022-02-14 | End: 2022-04-01 | Stop reason: SDUPTHER

## 2022-02-14 RX ORDER — METOPROLOL TARTRATE 50 MG/1
50 TABLET, FILM COATED ORAL 2 TIMES DAILY
Qty: 180 TABLET | Refills: 3 | Status: SHIPPED | OUTPATIENT
Start: 2022-02-14 | End: 2022-04-01 | Stop reason: SDUPTHER

## 2022-02-25 ENCOUNTER — TELEPHONE (OUTPATIENT)
Dept: INTERNAL MEDICINE CLINIC | Age: 82
End: 2022-02-25

## 2022-02-25 NOTE — TELEPHONE ENCOUNTER
----- Message from Zachary Juarez sent at 2/25/2022  4:16 PM EST -----  Contact: [phone]  Change to lisinopril 20 mg bid and call back next week with readings. Per Dr Sophy Gomez  ----- Message -----  From: Zachary Juarez  Sent: 2/25/2022   3:46 PM EST  To: Cindy Martinez MD    Patient states BP at noon today was 147/83. Has been running higher. Three days ago was 197/93. Other readings she had recorded are 177/72, 174/97, 177/95, and 162/81.  ----- Message -----  From: Cindy Martinez MD  Sent: 2/25/2022   3:42 PM EST  To: Belgica Maria from BP meds unless she has low BP recordings.  Can she check at home    Recheck covid test in 2 days if symptoms persists  ----- Message -----  From: Zachary Juarez  Sent: 2/25/2022   3:30 PM EST  To: Cindy Martinez MD    Patient took home test this morning that was neg  ----- Message -----  From: Cindy Martinez MD  Sent: 2/25/2022   3:23 PM EST  To: Jer Rebollar for covid  She has fever  ----- Message -----  From: David Shook MA  Sent: 2/25/2022   2:01 PM EST  To: MD Dr Sheldon Ahumada pt  Called with a complaint of headache, nausea, vomiting, diarrhea, low grade fever, dizziness, her blood pressure has been running high and was wondering if it could be from her blood pressure medication please advise

## 2022-02-28 ENCOUNTER — APPOINTMENT (OUTPATIENT)
Dept: CT IMAGING | Age: 82
End: 2022-02-28
Payer: COMMERCIAL

## 2022-02-28 ENCOUNTER — HOSPITAL ENCOUNTER (EMERGENCY)
Age: 82
Discharge: HOME | End: 2022-02-28
Attending: EMERGENCY MEDICINE
Payer: COMMERCIAL

## 2022-02-28 ENCOUNTER — APPOINTMENT (OUTPATIENT)
Dept: GENERAL RADIOLOGY | Age: 82
End: 2022-02-28
Payer: COMMERCIAL

## 2022-02-28 ENCOUNTER — TELEPHONE (OUTPATIENT)
Dept: INTERNAL MEDICINE CLINIC | Age: 82
End: 2022-02-28

## 2022-02-28 VITALS
HEART RATE: 93 BPM | DIASTOLIC BLOOD PRESSURE: 74 MMHG | SYSTOLIC BLOOD PRESSURE: 139 MMHG | BODY MASS INDEX: 20.4 KG/M2 | HEIGHT: 67 IN | WEIGHT: 130 LBS | OXYGEN SATURATION: 95 % | RESPIRATION RATE: 16 BRPM | TEMPERATURE: 99.5 F

## 2022-02-28 DIAGNOSIS — K63.89 COLONIC EDEMA: ICD-10-CM

## 2022-02-28 DIAGNOSIS — I10 UNCONTROLLED HYPERTENSION: Primary | ICD-10-CM

## 2022-02-28 LAB
A/G RATIO: 1.2 (ref 1.1–2.2)
ALBUMIN SERPL-MCNC: 4.2 G/DL (ref 3.4–5)
ALP BLD-CCNC: 56 U/L (ref 40–129)
ALT SERPL-CCNC: 19 U/L (ref 10–40)
ANION GAP SERPL CALCULATED.3IONS-SCNC: 11 MMOL/L (ref 3–16)
AST SERPL-CCNC: 20 U/L (ref 15–37)
BACTERIA: ABNORMAL /HPF
BASOPHILS ABSOLUTE: 0 K/UL (ref 0–0.2)
BASOPHILS RELATIVE PERCENT: 0.6 %
BILIRUB SERPL-MCNC: <0.2 MG/DL (ref 0–1)
BILIRUBIN URINE: NEGATIVE
BLOOD, URINE: NEGATIVE
BUN BLDV-MCNC: 7 MG/DL (ref 7–20)
CALCIUM SERPL-MCNC: 10.1 MG/DL (ref 8.3–10.6)
CHLORIDE BLD-SCNC: 93 MMOL/L (ref 99–110)
CLARITY: ABNORMAL
CO2: 27 MMOL/L (ref 21–32)
COLOR: YELLOW
CREAT SERPL-MCNC: 0.7 MG/DL (ref 0.6–1.2)
EOSINOPHILS ABSOLUTE: 0.2 K/UL (ref 0–0.6)
EOSINOPHILS RELATIVE PERCENT: 3.1 %
EPITHELIAL CELLS, UA: ABNORMAL /HPF (ref 0–5)
GFR AFRICAN AMERICAN: >60
GFR NON-AFRICAN AMERICAN: >60
GLUCOSE BLD-MCNC: 119 MG/DL (ref 70–99)
GLUCOSE URINE: NEGATIVE MG/DL
HCT VFR BLD CALC: 37.2 % (ref 36–48)
HEMOGLOBIN: 12.5 G/DL (ref 12–16)
KETONES, URINE: NEGATIVE MG/DL
LEUKOCYTE ESTERASE, URINE: ABNORMAL
LYMPHOCYTES ABSOLUTE: 1.1 K/UL (ref 1–5.1)
LYMPHOCYTES RELATIVE PERCENT: 16.8 %
MCH RBC QN AUTO: 28.5 PG (ref 26–34)
MCHC RBC AUTO-ENTMCNC: 33.7 G/DL (ref 31–36)
MCV RBC AUTO: 84.5 FL (ref 80–100)
MICROSCOPIC EXAMINATION: YES
MONOCYTES ABSOLUTE: 0.7 K/UL (ref 0–1.3)
MONOCYTES RELATIVE PERCENT: 11.4 %
NEUTROPHILS ABSOLUTE: 4.4 K/UL (ref 1.7–7.7)
NEUTROPHILS RELATIVE PERCENT: 68.1 %
NITRITE, URINE: NEGATIVE
PDW BLD-RTO: 13.5 % (ref 12.4–15.4)
PH UA: 7 (ref 5–8)
PLATELET # BLD: 282 K/UL (ref 135–450)
PMV BLD AUTO: 7.5 FL (ref 5–10.5)
POTASSIUM REFLEX MAGNESIUM: 4.3 MMOL/L (ref 3.5–5.1)
PROTEIN UA: NEGATIVE MG/DL
RBC # BLD: 4.4 M/UL (ref 4–5.2)
RBC UA: ABNORMAL /HPF (ref 0–4)
SODIUM BLD-SCNC: 131 MMOL/L (ref 136–145)
SPECIFIC GRAVITY UA: 1.01 (ref 1–1.03)
TOTAL PROTEIN: 7.7 G/DL (ref 6.4–8.2)
TROPONIN: <0.01 NG/ML
URINE REFLEX TO CULTURE: YES
URINE TYPE: ABNORMAL
UROBILINOGEN, URINE: 0.2 E.U./DL
WBC # BLD: 6.5 K/UL (ref 4–11)
WBC UA: ABNORMAL /HPF (ref 0–5)

## 2022-02-28 PROCEDURE — 93005 ELECTROCARDIOGRAM TRACING: CPT | Performed by: EMERGENCY MEDICINE

## 2022-02-28 PROCEDURE — 99285 EMERGENCY DEPT VISIT HI MDM: CPT

## 2022-02-28 PROCEDURE — 87086 URINE CULTURE/COLONY COUNT: CPT

## 2022-02-28 PROCEDURE — 2580000003 HC RX 258: Performed by: EMERGENCY MEDICINE

## 2022-02-28 PROCEDURE — 84484 ASSAY OF TROPONIN QUANT: CPT

## 2022-02-28 PROCEDURE — 6370000000 HC RX 637 (ALT 250 FOR IP): Performed by: EMERGENCY MEDICINE

## 2022-02-28 PROCEDURE — 71046 X-RAY EXAM CHEST 2 VIEWS: CPT

## 2022-02-28 PROCEDURE — 80053 COMPREHEN METABOLIC PANEL: CPT

## 2022-02-28 PROCEDURE — 74177 CT ABD & PELVIS W/CONTRAST: CPT

## 2022-02-28 PROCEDURE — 85025 COMPLETE CBC W/AUTO DIFF WBC: CPT

## 2022-02-28 PROCEDURE — 81001 URINALYSIS AUTO W/SCOPE: CPT

## 2022-02-28 PROCEDURE — 6360000004 HC RX CONTRAST MEDICATION: Performed by: EMERGENCY MEDICINE

## 2022-02-28 PROCEDURE — 36415 COLL VENOUS BLD VENIPUNCTURE: CPT

## 2022-02-28 RX ORDER — 0.9 % SODIUM CHLORIDE 0.9 %
1000 INTRAVENOUS SOLUTION INTRAVENOUS ONCE
Status: COMPLETED | OUTPATIENT
Start: 2022-02-28 | End: 2022-02-28

## 2022-02-28 RX ORDER — AMOXICILLIN AND CLAVULANATE POTASSIUM 875; 125 MG/1; MG/1
1 TABLET, FILM COATED ORAL ONCE
Status: COMPLETED | OUTPATIENT
Start: 2022-02-28 | End: 2022-02-28

## 2022-02-28 RX ORDER — AMOXICILLIN AND CLAVULANATE POTASSIUM 875; 125 MG/1; MG/1
1 TABLET, FILM COATED ORAL 2 TIMES DAILY
Qty: 20 TABLET | Refills: 0 | Status: SHIPPED | OUTPATIENT
Start: 2022-02-28 | End: 2022-03-10

## 2022-02-28 RX ADMIN — SODIUM CHLORIDE 1000 ML: 9 INJECTION, SOLUTION INTRAVENOUS at 20:08

## 2022-02-28 RX ADMIN — AMOXICILLIN AND CLAVULANATE POTASSIUM 1 TABLET: 875; 125 TABLET, FILM COATED ORAL at 21:55

## 2022-02-28 RX ADMIN — IOPAMIDOL 75 ML: 755 INJECTION, SOLUTION INTRAVENOUS at 20:43

## 2022-02-28 NOTE — TELEPHONE ENCOUNTER
----- Message from Sravanthi Hairston sent at 2/28/2022  4:23 PM EST -----  Contact: Tessy Jane  [phone]  Per Dr Ankur De La Rosa to monitor. Go to ER if worse   ----- Message -----  From: Mike Rolon  Sent: 2/28/2022   1:47 PM EST  To: Lois Bertrand MD    Pt states she is getting better, her BP around 12:00 was 148/83 with HR- 69, red spots are fading and going away slowly but still has some, currently does not have a fever and states it goes up and down. She does not have anybody to take her to the ER or Urgent Care and feels she does not need to go. Please advise.  ----- Message -----  From: Lois Bertrand MD  Sent: 2/28/2022   1:41 PM EST  To: Mike Rolon    ER or Urgent care  ----- Message -----  From: Raineaye Francisco Javier  Sent: 2/28/2022  10:28 AM EST  To: Lois Bertrand MD    Patient called and stated been sick for a week patient states her blood pressure this morning was 170\95 pulse 103. Patient states that she has red spots broke out all over her body. Patient states is running a fever. Patient has taken a Covid test and it was negative. Patient want s to know how to proceed. Patient stated she does her medication through mail order but if it was something needed right away she would use [facility].  Maryanne Wei

## 2022-03-01 LAB
EKG ATRIAL RATE: 81 BPM
EKG DIAGNOSIS: NORMAL
EKG P AXIS: 77 DEGREES
EKG P-R INTERVAL: 148 MS
EKG Q-T INTERVAL: 384 MS
EKG QRS DURATION: 72 MS
EKG QTC CALCULATION (BAZETT): 446 MS
EKG R AXIS: 57 DEGREES
EKG T AXIS: 57 DEGREES
EKG VENTRICULAR RATE: 81 BPM
URINE CULTURE, ROUTINE: NORMAL

## 2022-03-01 PROCEDURE — 93010 ELECTROCARDIOGRAM REPORT: CPT | Performed by: INTERNAL MEDICINE

## 2022-03-01 NOTE — ED NOTES
Pt AVS and follow up reviewed, pt rx discussed. Pt and family expressed understanding and discharged at this time.       Alex Stock RN  02/28/22 [id number]

## 2022-03-01 NOTE — ED PROVIDER NOTES
MT. [facility]  Hypertension (Pt sent to ED by PCP for hypertension. Pt states she has been sick recently. )       [facility]  Emily Davidson is a 78 y.o. female with a past medical history of hypertension who presents to the ED complaining of high blood pressure. The patient states that her blood pressure has been elevated for the past week. She denies any changes in her medications she takes metoprolol, amlodipine and lisinopril. She states that this morning she took her blood pressure and it was 198/110, it improved around noon and then was elevated again tonight. She called her doctor several days ago was told to come to the ED however did not have a ride here so her son brought her in today. She denies chest pain or shortness of breath. She describes a mild, chronic frontal headache nothing new. She states that she has not felt well for the past week she had an episode of vomiting last week, as well as diarrhea. The symptoms have largely resolved, however she is continued to feel weak and fatigued. She tells me she has not had a recent fever, I see from primary care notes that she called this morning and informed them she did have a fever. She denies abdominal pain, or recent cough. She had 2 negative home Covid test.    No other complaints, modifying factors or associated symptoms. I have reviewed the following from the nursing documentation.     Past Medical History:   Diagnosis Date    Benign essential hypertension 11/15/2017    Central retinal artery occlusion     right eye x3    Dupuytren's contracture of both hands 3/14/2014    Hyperlipidemia     Hypothyroidism     Mitral valve disease     Osteoporosis     Osteoporosis without current pathological fracture 8/9/2017    Other musculoskeletal symptoms referable to limbs(729.89)     Trigger finger (acquired)      Past Surgical History:   Procedure Laterality Date    COLONOSCOPY  1/11/2006  COLONOSCOPY N/A 7/8/2020    COLON W/ANES. (10:00) performed by Hair Vital MD at [place] [address]  7/22/11    cataract left eye    HAND SURGERY Left 09/28/2017    Left Dupuytrens disease excision from thumb and index finger    HYSTERECTOMY       Family History   Problem Relation Age of Onset    Breast Cancer Sister     Cancer Sister         Thyroid/Uterine    Diabetes Sister     Thyroid Disease Sister      Social History     Socioeconomic History    Marital status:      Spouse name: Not on file    Number of children: 10    Years of education: Not on file    Highest education level: Not on file   Occupational History    Not on file   Tobacco Use    Smoking status: Never Smoker    Smokeless tobacco: Never Used   Vaping Use    Vaping Use: Never used   Substance and Sexual Activity    Alcohol use: No    Drug use: No    Sexual activity: Not Currently     Partners: Male   Other Topics Concern    Not on file   Social History Narrative    Not on file     Social Determinants of Health     Financial Resource Strain:     Difficulty of Paying Living Expenses: Not on file   Food Insecurity:     Worried About Running Out of Food in the Last Year: Not on file    Geraldine of Food in the Last Year: Not on file   Transportation Needs:     Lack of Transportation (Medical): Not on file    Lack of Transportation (Non-Medical):  Not on file   Physical Activity:     Days of Exercise per Week: Not on file    Minutes of Exercise per Session: Not on file   Stress:     Feeling of Stress : Not on file   Social Connections:     Frequency of Communication with Friends and Family: Not on file    Frequency of Social Gatherings with Friends and Family: Not on file    Attends Christian Services: Not on file    Active Member of Clubs or Organizations: Not on file    Attends Club or Organization Meetings: Not on file    Marital Status: Not on file   Intimate Partner Violence: intact. Heart: RRR. No murmurs. Lungs: Respirations unlabored. CTAB. No wheezes, rales, or rhonchi. Good air exchange  Abdomen: mild tenderness to palpation lower abdomen. Soft. Non distended. No peritoneal signs. Musculoskeletal: No extremity edema. Compartments soft. No deformity. No tenderness in the extremities. All extremities neurovascularly intact. Radial, Dp, and PT pulses +2/4 bilaterally  Neurological: Alert and oriented. No focal deficits. No aphasia or dysarthria. No gait ataxia. Psychiatric: Normal mood and affect. LABS  I have reviewed all labs for this visit.    Results for orders placed or performed during the hospital encounter of 02/28/22   CBC with Auto Differential   Result Value Ref Range    WBC 6.5 4.0 - 11.0 K/uL    RBC 4.40 4.00 - 5.20 M/uL    Hemoglobin 12.5 12.0 - 16.0 g/dL    Hematocrit 37.2 36.0 - 48.0 %    MCV 84.5 80.0 - 100.0 fL    MCH 28.5 26.0 - 34.0 pg    MCHC 33.7 31.0 - 36.0 g/dL    RDW 13.5 12.4 - 15.4 %    Platelets 839 936 - 321 K/uL    MPV 7.5 5.0 - 10.5 fL    Neutrophils % 68.1 %    Lymphocytes % 16.8 %    Monocytes % 11.4 %    Eosinophils % 3.1 %    Basophils % 0.6 %    Neutrophils Absolute 4.4 1.7 - 7.7 K/uL    Lymphocytes Absolute 1.1 1.0 - 5.1 K/uL    Monocytes Absolute 0.7 0.0 - 1.3 K/uL    Eosinophils Absolute 0.2 0.0 - 0.6 K/uL    Basophils Absolute 0.0 0.0 - 0.2 K/uL   Comprehensive Metabolic Panel w/ Reflex to MG   Result Value Ref Range    Sodium 131 (L) 136 - 145 mmol/L    Potassium reflex Magnesium 4.3 3.5 - 5.1 mmol/L    Chloride 93 (L) 99 - 110 mmol/L    CO2 27 21 - 32 mmol/L    Anion Gap 11 3 - 16    Glucose 119 (H) 70 - 99 mg/dL    BUN 7 7 - 20 mg/dL    CREATININE 0.7 0.6 - 1.2 mg/dL    GFR Non-African American >60 >60    GFR African American >60 >60    Calcium 10.1 8.3 - 10.6 mg/dL    Total Protein 7.7 6.4 - 8.2 g/dL    Albumin 4.2 3.4 - 5.0 g/dL    Albumin/Globulin Ratio 1.2 1.1 - 2.2    Total Bilirubin <0.2 0.0 - 1.0 mg/dL    Alkaline noted.  There is a calcified granuloma in the lower right lung. Pleural space:  No acute findings. No pneumothorax. Heart:  No acute findings. No cardiomegaly. Mediastinum:  No acute findings. Bones/joints:  Pectus excavatum deformity of the sternum. No acute findings in the chest.     CT ABDOMEN PELVIS W IV CONTRAST Additional Contrast? None    Result Date: 2/28/2022  EXAMINATION: CT OF THE ABDOMEN AND PELVIS WITH CONTRAST 2/28/2022 8:41 pm TECHNIQUE: CT of the abdomen and pelvis was performed with the administration of intravenous contrast. Multiplanar reformatted images are provided for review. Dose modulation, iterative reconstruction, and/or weight based adjustment of the mA/kV was utilized to reduce the radiation dose to as low as reasonably achievable. COMPARISON: None. HISTORY: ORDERING SYSTEM PROVIDED HISTORY: vomiting and diarrhea TECHNOLOGIST PROVIDED HISTORY: Additional Contrast?->None Reason for exam:->vomiting and diarrhea Decision Support Exception - unselect if not a suspected or confirmed emergency medical condition->Emergency Medical Condition (MA) Reason for Exam: pt states she had vomiting and diarrhea last week   feels better today FINDINGS: Lower Chest: Imaged lung bases are clear. Organs: The liver and gallbladder appear grossly unremarkable. Several calcified granuloma of the spleen. Somewhat wedge-shaped hypodensity at the periphery of the spleen may reflect an age-indeterminate infarct. No comparison exams are available. The pancreas and bilateral adrenal glands demonstrate no acute findings. The kidneys enhance symmetrically. Mild extrarenal pelvis bilaterally. The ureters are normal in caliber. No obstructive uropathy identified. GI/Bowel: No bowel obstruction is evident. Severe colonic diverticulosis. No definitive CT evidence of diverticulitis. There is a moderate to large volume of stool throughout the colon.   Nonspecific asymmetrical wall thickening and edema of the ascending colon. The appendix is not identified. Findings would be atypical for colitis which would be expected to demonstrate symmetrical and circumferential wall thickening. Colonic mass cannot be excluded. Pelvis: The bladder appears unremarkable. The uterus is surgically absent. Peritoneum/Retroperitoneum: The abdominal aorta is normal in caliber. No retroperitoneal adenopathy. No free fluid or free air identified within the abdomen and pelvis. 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 Bones/Soft Tissues: No acute osseous abnormality identified. 1. No bowel obstruction identified. 2. Asymmetric wall thickening and edema along the ascending colon. This is nonspecific with findings being atypical for colitis which would be expected to demonstrate symmetrical and circumferential wall thickening. Colonic mass cannot entirely be excluded. Please note that the appendix is not identified. A moderate to large volume of stool is present throughout the colon. Consider nonemergent colonoscopy/direct visualization. 3. Extensive colonic diverticulosis without definitive evidence for diverticulitis. 4. Wedge-shaped hypoattenuation at the periphery of the spleen which is nonspecific and possibly reflects splenic infarct given the location and appearance. No comparison exams are available. 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 ED COURSE/MDM  Patient seen and evaluated. Old records reviewed. Labs and imaging reviewed and results discussed with patient. This is an 78-year-old female presenting for evaluation of elevated blood pressure. She is 192/84 on arrival here. Vital signs otherwise within normal limits. On exam she is nontoxic-appearing on exam. As far as her blood pressure, there is no sign of endorgan damage here. Her blood pressure did improve spontaneously to 139/74 at the end of her visit. I feel that she can follow-up with primary care regarding her blood pressure and she is comfortable with this. Aside from her blood pressure, she also describes diarrhea and not feeling well for the past week. Therefore I obtained further work-up. She does not have a leukocytosis, anemia, transaminitis or JOSE. She has a mild hyponatremia of 131 and is given fluids. This could be related to volume depletion given the fact that she has had vomiting and diarrhea for the past week. She did have a questionable UTI, urine is sent for culture. I did order a CT abdomen pelvis since the patient has had GI symptoms with reported fevers at home. This did show some nonspecific colonic edema, atypical of appearance of colitis and a mass could not be excluded. The radiology findings are not entirely clear, however the patient will be given Augmentin to cover for the potential colitis as well as UTI. Regarding the fact that this could be a questionable mass, I explained this to her and the fact that she needs to follow-up with GI she is given a referral. She voices understanding of this. She is requesting discharge home. I feel this is reasonable since her work-up is overall reassuring. She will follow up with primary care and GI. We discussed strict return precautions back to the ED and she voices understanding. During the patient's ED course, the patient was given:  Medications   0.9 % sodium chloride bolus (0 mLs IntraVENous Stopped 2/28/22 2108)   iopamidol (ISOVUE-370) 76 % injection 75 mL (75 mLs IntraVENous Given 2/28/22 2043)   amoxicillin-clavulanate (AUGMENTIN) 875-125 MG per tablet 1 tablet (1 tablet Oral Given 2/28/22 2155)        CLINICAL IMPRESSION  1. Uncontrolled hypertension    2. Colonic edema        Blood pressure 139/74, pulse 93, temperature 99.5 °F (37.5 °C), temperature source Rectal, resp. rate 16, height 5' 7\" (1.702 m), weight 130 lb (59 kg), SpO2 95 %, not currently breastfeeding.       Patient was given scripts for the following medications. I counseled patient how to take these medications. Discharge Medication List as of 2/28/2022  9:59 PM      START taking these medications    Details   amoxicillin-clavulanate (AUGMENTIN) 875-125 MG per tablet Take 1 tablet by mouth 2 times daily for 10 days, Disp-20 tablet, R-0Print             Follow-up with:  Lois Bertrand MD  [address], [facility]  [phone]    Call in 1 day      Tal Polk MD  [address], [address] [place] [address] [phone]    Call in 1 day        DISCLAIMER: This chart was created using Dragon dictation software. Efforts were made by me to ensure accuracy, however some errors may be present due to limitations of this technology and occasionally words are not transcribed correctly.        Vonnie, [address] [place] [address]  03/01/22 7071

## 2022-03-17 ENCOUNTER — HOSPITAL ENCOUNTER (OUTPATIENT)
Age: 82
Discharge: HOME | End: 2022-03-17
Payer: COMMERCIAL

## 2022-03-17 PROCEDURE — U0005 INFEC AGEN DETEC AMPLI PROBE: HCPCS

## 2022-03-17 PROCEDURE — U0003 INFECTIOUS AGENT DETECTION BY NUCLEIC ACID (DNA OR RNA); SEVERE ACUTE RESPIRATORY SYNDROME CORONAVIRUS 2 (SARS-COV-2) (CORONAVIRUS DISEASE [COVID-19]), AMPLIFIED PROBE TECHNIQUE, MAKING USE OF HIGH THROUGHPUT TECHNOLOGIES AS DESCRIBED BY CMS-2020-01-R: HCPCS

## 2022-03-17 NOTE — PROGRESS NOTES
[facility] Preoperative Screening for Elective Surgery/Invasive Procedures While COVID-19 present in the community     Have you had any of the following symptoms? o Fever, chills  o Cough  o Shortness of breath  o Muscle aches/pain  o Diarrhea  o Abdominal pain, nausea, vomiting  o Loss or decrease in taste and / or smell   Risk of Exposure  o Have you recently been hospitalized for COVID-19 or flu-like illness, if so when?  o Recently diagnosed with COVID-19, if so when?  o Recently tested for COVID-19, if so when?  o Have you been in close contact with a person or family member who currently has or recently had COVID-19? If yes, when and in what context?  o Do you live with anybody who in the last 14 days has had fever, chills, shortness of breath, muscle aches, flu-like illness?  o Do you have any close contacts or family members who are currently in the hospital for COVID-19 or flu-like illness? If yes, assess recent close contact with this person. Indicate if the patient has a positive screen by answering yes to one or more of the above questions. Patients who test positive or screen positive prior to surgery or on the day of surgery should be evaluated in conjunction with the surgeon/proceduralist/anesthesiologist to determine the urgency of the procedure.

## 2022-03-17 NOTE — PROGRESS NOTES

## 2022-03-18 LAB — SARS-COV-2: NOT DETECTED

## 2022-03-21 ENCOUNTER — ANESTHESIA EVENT (OUTPATIENT)
Dept: ENDOSCOPY | Age: 82
End: 2022-03-21
Payer: COMMERCIAL

## 2022-03-23 ENCOUNTER — ANESTHESIA (OUTPATIENT)
Dept: ENDOSCOPY | Age: 82
End: 2022-03-23
Payer: COMMERCIAL

## 2022-03-23 ENCOUNTER — HOSPITAL ENCOUNTER (OUTPATIENT)
Age: 82
Setting detail: OUTPATIENT SURGERY
Discharge: HOME | End: 2022-03-23
Attending: INTERNAL MEDICINE | Admitting: INTERNAL MEDICINE
Payer: COMMERCIAL

## 2022-03-23 VITALS
HEIGHT: 67 IN | DIASTOLIC BLOOD PRESSURE: 75 MMHG | RESPIRATION RATE: 16 BRPM | SYSTOLIC BLOOD PRESSURE: 124 MMHG | WEIGHT: 130 LBS | TEMPERATURE: 97.9 F | OXYGEN SATURATION: 100 % | BODY MASS INDEX: 20.4 KG/M2 | HEART RATE: 63 BPM

## 2022-03-23 VITALS
DIASTOLIC BLOOD PRESSURE: 59 MMHG | SYSTOLIC BLOOD PRESSURE: 116 MMHG | OXYGEN SATURATION: 98 % | RESPIRATION RATE: 13 BRPM

## 2022-03-23 PROCEDURE — 3700000001 HC ADD 15 MINUTES (ANESTHESIA): Performed by: INTERNAL MEDICINE

## 2022-03-23 PROCEDURE — 3700000000 HC ANESTHESIA ATTENDED CARE: Performed by: INTERNAL MEDICINE

## 2022-03-23 PROCEDURE — 6360000002 HC RX W HCPCS: Performed by: NURSE ANESTHETIST, CERTIFIED REGISTERED

## 2022-03-23 PROCEDURE — 2500000003 HC RX 250 WO HCPCS: Performed by: ANESTHESIOLOGY

## 2022-03-23 PROCEDURE — 2500000003 HC RX 250 WO HCPCS: Performed by: NURSE ANESTHETIST, CERTIFIED REGISTERED

## 2022-03-23 PROCEDURE — 7100000010 HC PHASE II RECOVERY - FIRST 15 MIN: Performed by: INTERNAL MEDICINE

## 2022-03-23 PROCEDURE — 3609027000 HC COLONOSCOPY: Performed by: INTERNAL MEDICINE

## 2022-03-23 PROCEDURE — 7100000011 HC PHASE II RECOVERY - ADDTL 15 MIN: Performed by: INTERNAL MEDICINE

## 2022-03-23 PROCEDURE — 2580000003 HC RX 258: Performed by: ANESTHESIOLOGY

## 2022-03-23 PROCEDURE — 2709999900 HC NON-CHARGEABLE SUPPLY: Performed by: INTERNAL MEDICINE

## 2022-03-23 RX ORDER — SODIUM CHLORIDE 9 MG/ML
25 INJECTION, SOLUTION INTRAVENOUS PRN
Status: DISCONTINUED | OUTPATIENT
Start: 2022-03-23 | End: 2022-03-23 | Stop reason: HOSPADM

## 2022-03-23 RX ORDER — MEPERIDINE HYDROCHLORIDE 25 MG/ML
12.5 INJECTION INTRAMUSCULAR; INTRAVENOUS; SUBCUTANEOUS EVERY 5 MIN PRN
Status: DISCONTINUED | OUTPATIENT
Start: 2022-03-23 | End: 2022-03-23 | Stop reason: HOSPADM

## 2022-03-23 RX ORDER — SODIUM CHLORIDE, SODIUM LACTATE, POTASSIUM CHLORIDE, CALCIUM CHLORIDE 600; 310; 30; 20 MG/100ML; MG/100ML; MG/100ML; MG/100ML
INJECTION, SOLUTION INTRAVENOUS CONTINUOUS
Status: DISCONTINUED | OUTPATIENT
Start: 2022-03-23 | End: 2022-03-23 | Stop reason: DRUGHIGH

## 2022-03-23 RX ORDER — SODIUM CHLORIDE 0.9 % (FLUSH) 0.9 %
10 SYRINGE (ML) INJECTION PRN
Status: DISCONTINUED | OUTPATIENT
Start: 2022-03-23 | End: 2022-03-23 | Stop reason: HOSPADM

## 2022-03-23 RX ORDER — PROPOFOL 10 MG/ML
INJECTION, EMULSION INTRAVENOUS PRN
Status: DISCONTINUED | OUTPATIENT
Start: 2022-03-23 | End: 2022-03-23 | Stop reason: SDUPTHER

## 2022-03-23 RX ORDER — LIDOCAINE HYDROCHLORIDE 20 MG/ML
INJECTION, SOLUTION INFILTRATION; PERINEURAL PRN
Status: DISCONTINUED | OUTPATIENT
Start: 2022-03-23 | End: 2022-03-23 | Stop reason: SDUPTHER

## 2022-03-23 RX ORDER — SODIUM CHLORIDE 0.9 % (FLUSH) 0.9 %
5-40 SYRINGE (ML) INJECTION PRN
Status: DISCONTINUED | OUTPATIENT
Start: 2022-03-23 | End: 2022-03-23 | Stop reason: HOSPADM

## 2022-03-23 RX ORDER — SODIUM CHLORIDE 0.9 % (FLUSH) 0.9 %
5-40 SYRINGE (ML) INJECTION EVERY 12 HOURS SCHEDULED
Status: DISCONTINUED | OUTPATIENT
Start: 2022-03-23 | End: 2022-03-23 | Stop reason: HOSPADM

## 2022-03-23 RX ORDER — ONDANSETRON 2 MG/ML
4 INJECTION INTRAMUSCULAR; INTRAVENOUS
Status: DISCONTINUED | OUTPATIENT
Start: 2022-03-23 | End: 2022-03-23 | Stop reason: HOSPADM

## 2022-03-23 RX ORDER — SODIUM CHLORIDE, SODIUM LACTATE, POTASSIUM CHLORIDE, CALCIUM CHLORIDE 600; 310; 30; 20 MG/100ML; MG/100ML; MG/100ML; MG/100ML
INJECTION, SOLUTION INTRAVENOUS CONTINUOUS
Status: DISCONTINUED | OUTPATIENT
Start: 2022-03-23 | End: 2022-03-23 | Stop reason: HOSPADM

## 2022-03-23 RX ORDER — SODIUM CHLORIDE 0.9 % (FLUSH) 0.9 %
10 SYRINGE (ML) INJECTION EVERY 12 HOURS SCHEDULED
Status: DISCONTINUED | OUTPATIENT
Start: 2022-03-23 | End: 2022-03-23 | Stop reason: HOSPADM

## 2022-03-23 RX ADMIN — LIDOCAINE HYDROCHLORIDE 60 MG: 20 INJECTION, SOLUTION INFILTRATION; PERINEURAL at 10:21

## 2022-03-23 RX ADMIN — PROPOFOL 160 MG: 10 INJECTION, EMULSION INTRAVENOUS at 10:21

## 2022-03-23 RX ADMIN — SODIUM CHLORIDE, POTASSIUM CHLORIDE, SODIUM LACTATE AND CALCIUM CHLORIDE: 600; 310; 30; 20 INJECTION, SOLUTION INTRAVENOUS at 10:15

## 2022-03-23 RX ADMIN — FAMOTIDINE 20 MG: 10 INJECTION, SOLUTION INTRAVENOUS at 09:30

## 2022-03-23 ASSESSMENT — LIFESTYLE VARIABLES: SMOKING_STATUS: 0

## 2022-03-23 ASSESSMENT — PAIN SCALES - GENERAL: PAINLEVEL_OUTOF10: 0

## 2022-03-23 ASSESSMENT — ENCOUNTER SYMPTOMS: SHORTNESS OF BREATH: 0

## 2022-03-23 ASSESSMENT — PAIN - FUNCTIONAL ASSESSMENT: PAIN_FUNCTIONAL_ASSESSMENT: 0-10

## 2022-03-23 NOTE — PROGRESS NOTES
Reviewed discharge instructions with patient and son in law Gómez Blood, they verbalized understanding, will call MD with any concerns.

## 2022-03-23 NOTE — ANESTHESIA PRE PROCEDURE
Branden Hannah MD           Allergies: Allergies   Allergen Reactions    Latex      Skin irritation when wears gloves often. Problem List:    Patient Active Problem List   Diagnosis Code    Central retinal artery occlusion H34.10    Hyperlipidemia E78.5    Hypothyroidism E03.9    Trigger finger, acquired M65.30    Other musculoskeletal symptoms referable to limbs(729.89) R29.898    Dupuytren's contracture of both hands M72.0    Osteoporosis without current pathological fracture M81.0    Benign essential hypertension I10       Past Medical History:        Diagnosis Date    Benign essential hypertension 11/15/2017    Central retinal artery occlusion     right eye x3    Dupuytren's contracture of both hands 3/14/2014    Hyperlipidemia     Hypothyroidism     Mitral valve disease     Osteoporosis     Osteoporosis without current pathological fracture 8/9/2017    Other musculoskeletal symptoms referable to limbs(729.89)     Trigger finger (acquired)        Past Surgical History:        Procedure Laterality Date    COLONOSCOPY  1/11/2006    COLONOSCOPY N/A 7/8/2020    COLON W/ANES. (10:00) performed by Shanel Ruiz MD at [address] [place] [address]  7/22/11    cataract left eye    HAND SURGERY Left 09/28/2017    Left Dupuytrens disease excision from thumb and index finger    HYSTERECTOMY         Social History:    Social History     Tobacco Use    Smoking status: Never Smoker    Smokeless tobacco: Never Used   Substance Use Topics    Alcohol use:  No                                Counseling given: Not Answered      Vital Signs (Current):   Vitals:    03/17/22 1301 03/23/22 0912   BP:  134/66   Pulse:  70   Resp:  16   Temp:  97.2 °F (36.2 °C)   TempSrc:  Temporal   SpO2:  99%   Weight: 130 lb (59 kg)    Height: 5' 7\" (1.702 m)                                               BP Readings from Last 3 Encounters:   03/23/22 134/66   02/28/22 139/74   12/09/21 120/80       NPO Status: Time of last liquid consumption: 0530                        Time of last solid consumption: 1900                        Date of last liquid consumption: 03/23/22                        Date of last solid food consumption: 03/21/22    BMI:   Wt Readings from Last 3 Encounters:   03/17/22 130 lb (59 kg)   02/28/22 130 lb (59 kg)   12/09/21 128 lb (58.1 kg)     Body mass index is 20.36 kg/m². CBC:   Lab Results   Component Value Date    WBC 6.5 02/28/2022    RBC 4.40 02/28/2022    HGB 12.5 02/28/2022    HCT 37.2 02/28/2022    MCV 84.5 02/28/2022    RDW 13.5 02/28/2022     02/28/2022       CMP:   Lab Results   Component Value Date     02/28/2022    K 4.3 02/28/2022    CL 93 02/28/2022    CO2 27 02/28/2022    BUN 7 02/28/2022    CREATININE 0.7 02/28/2022    GFRAA >60 02/28/2022    GFRAA >60 04/13/2013    AGRATIO 1.2 02/28/2022    LABGLOM >60 02/28/2022    LABGLOM 61 05/06/2015    GLUCOSE 119 02/28/2022    GLUCOSE 82 02/29/2012    PROT 7.7 02/28/2022    PROT 7.6 10/17/2012    CALCIUM 10.1 02/28/2022    BILITOT <0.2 02/28/2022    ALKPHOS 56 02/28/2022    AST 20 02/28/2022    ALT 19 02/28/2022       POC Tests: No results for input(s): POCGLU, POCNA, POCK, POCCL, POCBUN, POCHEMO, POCHCT in the last 72 hours.     Coags:   Lab Results   Component Value Date    PROTIME 11.1 08/22/2015    INR 1.03 08/22/2015    APTT 29.8 08/22/2015       HCG (If Applicable): No results found for: PREGTESTUR, PREGSERUM, HCG, HCGQUANT     ABGs: No results found for: PHART, PO2ART, BKW2PUK, HVJ9PMX, BEART, N7FCDNPI     Type & Screen (If Applicable):  No results found for: LABABO, LABRH    Drug/Infectious Status (If Applicable):  No results found for: HIV, HEPCAB    COVID-19 Screening (If Applicable):   Lab Results   Component Value Date    COVID19 Not Detected 03/17/2022           Anesthesia Evaluation  Patient summary reviewed no history of anesthetic complications:   Airway: Mallampati: II  TM distance: >3 FB   Neck ROM: full  Mouth opening: > = 3 FB Dental:    (+) upper dentures and lower dentures      Pulmonary:Negative Pulmonary ROS breath sounds clear to auscultation      (-) COPD, asthma, shortness of breath, recent URI, sleep apnea and not a current smoker          Patient did not smoke on day of surgery. Cardiovascular:    (+) hypertension:, hyperlipidemia    (-) pacemaker, past MI, CAD, CABG/stent, dysrhythmias,  angina and  CHF    ECG reviewed  Rhythm: regular  Rate: normal           Beta Blocker:  Dose within 24 Hrs         Neuro/Psych:   Negative Neuro/Psych ROS     (-) seizures, TIA and CVA           GI/Hepatic/Renal:   (+) bowel prep,      (-) GERD, liver disease, no renal disease and no morbid obesity       Endo/Other:    (+) hypothyroidism: arthritis:., electrolyte abnormalities, no malignancy/cancer. (-) diabetes mellitus, blood dyscrasia, no malignancy/cancer               Abdominal:       Abdomen: soft. Vascular: negative vascular ROS. Other Findings:             Anesthesia Plan      TIVA     ASA 2       Induction: intravenous. MIPS: Prophylactic antiemetics administered. Anesthetic plan and risks discussed with patient. Plan discussed with CRNA. This pre-anesthesia assessment may be used as a history and physical.    DOS STAFF ADDENDUM:    Pt seen and examined, chart reviewed (including anesthesia, drug and allergy history). No interval changes to history and physical examination. Anesthetic plan, risks, benefits, alternatives, and personnel involved discussed with patient. Patient verbalized an understanding and agrees to proceed.       Negar Ramos MD  March 23, 2022  9:54 AM      Negar Ramos MD   3/23/2022 Localized Dermabrasion Text: The patient was draped in routine manner.  Localized dermabrasion using 3 x 17 mm wire brush was performed in routine manner to papillary dermis. This spot dermabrasion is being performed to complete skin cancer reconstruction. It also will eliminate the other sun damaged precancerous cells that are known to be part of the regional effect of a lifetime's worth of sun exposure. This localized dermabrasion is therapeutic and should not be considered cosmetic in any regard. Localized Dermabrasion With Wire Brush Text: The patient was draped in routine manner.  Localized dermabrasion using 3 x 17 mm wire brush was performed in routine manner to papillary dermis. This spot dermabrasion is being performed to complete skin cancer reconstruction. It also will eliminate the other sun damaged precancerous cells that are known to be part of the regional effect of a lifetime's worth of sun exposure. This localized dermabrasion is therapeutic and should not be considered cosmetic in any regard.

## 2022-03-23 NOTE — OP NOTE
[address]                 [address] [place] [address]                                OPERATIVE REPORT    PATIENT NAME: Alban Myers                     :        1940  MED REC NO:   [id number]                          ROOM:  ACCOUNT NO:   [de-identified]                           ADMIT DATE: 2022  PROVIDER:     Torito Reza MD    DATE OF PROCEDURE:  2022    PREPROCEDURE DIAGNOSES:  1. Abnormal imaging. 2.  Colon wall thickening per CT scan. PROCEDURE:  Colonoscopy to the cecum. POSTPROCEDURE DIAGNOSIS:  1. Normal right colon. 2.  Moderate-to-severe sigmoid left-sided diverticulosis. 3.  Internal hemorrhoids. PROCEDURE INDICATIONS:  An 57-year-old female with history of  hypertension, hyperlipidemia, hypothyroidism, recently developed  abdominal pain and nausea/vomiting. She underwent further workup in the  emergency room with CT scan which showed thickening of the right colon  in the ascending colon, nonspecific, but worrisome for colitis. She was  given antibiotics and was referred for diagnostic colonoscopy. Her last  colonoscopy was in  which was negative except for diverticulosis. MEDICATIONS:  MAC per Anesthesia. PROCEDURE DETAILS:  Informed consent obtained after discussing risks,  benefits, alternatives. Full history and physical was performed. The  patient was classified as ASA class III. Medications were given by  Anesthesia. Cardiopulmonary status was continuously monitored  throughout the procedure. The patient was placed in left lateral  decubitus position. Once adequately sedated, a standard pediatric  colonoscope was inserted in the anus and advanced to the cecum,  identified by landmarks of appendiceal orifice and IC valve.   The entire  mucosa of the cecum, ascending colon, transverse colon, descending  colon, sigmoid colon, rectum were examined carefully during withdrawal.   The patient tolerated the procedure without any difficulty. Colon prep  was good in the right colon, but fair in the left colon. Retroflexed  view of the rectum was also performed. The patient tolerated the  procedure well without any difficulties. FINDINGS:  RECTUM:  The digital rectal exam was normal.  No masses were felt. COLON:  The entire visualized colon mucosa appeared normal and healthy  without any evidence of inflammation, ulcers, pseudomembranes, polyps or  masses. There was evidence of moderate-to-severe left-sided  diverticulosis. The prep was fair in the left colon but aggressive  lavage was performed. Retroflexed view of the rectum showed small  internal hemorrhoids. SUMMARY:  1.  Moderate-to-severe sigmoid diverticulosis. 2.  Small internal hemorrhoids. 3.  Otherwise normal colon to cecum. 4.  No abnormality identified in the ascending colon. RECOMMENDATIONS:  1. Discharge the patient to home when standard parameters are met. 2.  Encourage high-fiber diet. 3.  Recommend Metamucil 1 tablespoon twice daily. 4.  Encourage aggressive hydration and daily probiotics. 5.  Follow up as needed. 6.  Repeat colonoscopy not necessary, given the age and comorbidities.     EBL: <5mL    Benita Bush MD    D: 03/23/2022 10:39:50       T: 03/23/2022 10:42:10     [email or web address]  Job#: [id number]     Doc#: [id number]    CC:  MD René Calvillo MD

## 2022-03-23 NOTE — ANESTHESIA POSTPROCEDURE EVALUATION
Department of Anesthesiology  Postprocedure Note    Patient: Emily Davidson  MRN: [id number]  YOB: 1940  Date of evaluation: 3/23/2022  Time:  11:10 AM     Procedure Summary     Date: 03/23/22 Room / Location: [place] [address] / [facility]    Anesthesia Start: 2746 Anesthesia Stop: 1071    Procedure: COLON W/ANES. (10:00) (N/A ) Diagnosis: (COLITIS)    Surgeons: Joao Bautista MD Responsible Provider: Wilfrido Ragland MD    Anesthesia Type: MAC ASA Status: 2          Anesthesia Type: MAC    Ye Phase I: Ye Score: 10    Ye Phase II: Ye Score: 9    Last vitals: Reviewed and per EMR flowsheets.      Vitals:    03/17/22 1301 03/23/22 0912 03/23/22 1037 03/23/22 1100   BP:  134/66 (!) 84/52 119/66   Pulse:  70 65 64   Resp:  16 16 14   Temp:  97.2 °F (36.2 °C) 97.9 °F (36.6 °C)    TempSrc:  Temporal Temporal    SpO2:  99% 99% 100%   Weight: 130 lb (59 kg)      Height: 5' 7\" (1.702 m)        Anesthesia Post Evaluation    Patient location during evaluation: bedside  Patient participation: complete - patient participated  Level of consciousness: awake and alert  Airway patency: patent  Nausea & Vomiting: no nausea  Cardiovascular status: hemodynamically stable  Respiratory status: acceptable  Hydration status: euvolemic

## 2022-03-23 NOTE — BRIEF OP NOTE
Brief Postoperative Note      Patient: Mile Brady  YOB: 1940  MRN: [id number]    Date of Procedure: 3/23/2022    Pre-Op Diagnosis: COLITIS    Post-Op Diagnosis: L sided diverticulosis, otherwise normal colon       Procedure(s):  COLON W/ANES.  (10:00)    Surgeon(s):  Vesta Cantor MD    Assistant:  * No surgical staff found *    Anesthesia: Monitor Anesthesia Care    Estimated Blood Loss (mL): Minimal    Complications: None    Specimens:   * No specimens in log *    Implants:  * No implants in log *      Drains: * No LDAs found *    Findings: L sided diverticulosis, otherwise normal colon    Electronically signed by Vesta Cantor MD on 3/23/2022 at 10:41 AM

## 2022-03-23 NOTE — H&P
History and Physical / Pre-Sedation Assessment    Patient:  Jack Pérez   :   1940     Intended Procedure:  Colonoscopy    HPI:  78year-old female with a history of hemorrhoids, constipation, HTN, HLD, and hypothyroidism found to have assymetric ascending colon wall thickening per CT scan    Past Medical History:   Diagnosis Date    Benign essential hypertension 11/15/2017    Central retinal artery occlusion     right eye x3    Dupuytren's contracture of both hands 3/14/2014    Hyperlipidemia     Hypothyroidism     Mitral valve disease     Osteoporosis     Osteoporosis without current pathological fracture 2017    Other musculoskeletal symptoms referable to limbs(729.89)     Trigger finger (acquired)      Past Surgical History:   Procedure Laterality Date    COLONOSCOPY  2006    COLONOSCOPY N/A 2020    COLON W/ANES. (10:00) performed by Cece Gurrola MD at [address] [place] [address]  11    cataract left eye    HAND SURGERY Left 2017    Left Dupuytrens disease excision from thumb and index finger    HYSTERECTOMY         Medications reviewed  Prior to Admission medications    Medication Sig Start Date End Date Taking?  Authorizing Provider   amLODIPine (NORVASC) 5 MG tablet TAKE 1 TABLET BY MOUTH EVERY DAY 22   Vladimir Ball MD   levothyroxine (SYNTHROID) 75 MCG tablet TAKE 1 TABLET BY MOUTH EVERY DAY 22   Vladimir Ball MD   lisinopril (PRINIVIL;ZESTRIL) 20 MG tablet Take 1 tablet by mouth daily 2/14/22 5/15/22  Vladimir Ball MD   metoprolol tartrate (LOPRESSOR) 50 MG tablet Take 1 tablet by mouth 2 times daily 22   Vladimir Ball MD   simvastatin (ZOCOR) 20 MG tablet TAKE 1 TABLET BY MOUTH EVERY EVENING 22   Vladimir Ball MD   calcium carbonate (OYSTER SHELL CALCIUM 500 MG) 1250 (500 Ca) MG tablet Take 1 tablet by mouth daily    Historical Provider, MD   vitamin D 1000 units CAPS Take by mouth    Historical Provider, MD   NONFORMULARY Indications: Chloracedin    Historical Provider, MD   Multiple Vitamin (MULTI-VITAMIN) TABS Take  by mouth. Historical Provider, MD   aspirin 81 MG EC tablet Take 81 mg by mouth daily. Historical Provider, MD        Allergies: Allergies   Allergen Reactions    Latex      Skin irritation when wears gloves often. Nurses notes reviewed and agreed. Physical Exam:  Vital Signs: /66   Pulse 70   Temp 97.2 °F (36.2 °C) (Temporal)   Resp 16   Ht 5' 7\" (1.702 m)   Wt 130 lb (59 kg)   SpO2 99%   BMI 20.36 kg/m²    Airway: Mallampati: II (soft palate, uvula, fauces visible)  Pulmonary:Normal  Cardiac:Normal  Abdomen:Normal    Pre-Procedure Assessment / Plan:  ASA: Class 3 - A patient with severe systemic disease that limits activity but is not incapacitating  Level of Sedation Plan: Moderate sedation  Post Procedure plan: Return to same level of care    I assessed the patient and find that the patient is in satisfactory condition to proceed with the planned procedure and sedation plan. I have explained the risk, benefits, and alternatives to the procedure; the patient understands and agrees to proceed.        Joao Bautista MD  3/23/2022

## 2022-04-01 ENCOUNTER — OFFICE VISIT (OUTPATIENT)
Dept: INTERNAL MEDICINE CLINIC | Age: 82
End: 2022-04-01

## 2022-04-01 VITALS
SYSTOLIC BLOOD PRESSURE: 128 MMHG | HEART RATE: 70 BPM | HEIGHT: 67 IN | WEIGHT: 128 LBS | DIASTOLIC BLOOD PRESSURE: 80 MMHG | BODY MASS INDEX: 20.09 KG/M2 | RESPIRATION RATE: 12 BRPM

## 2022-04-01 DIAGNOSIS — E03.9 ACQUIRED HYPOTHYROIDISM: ICD-10-CM

## 2022-04-01 DIAGNOSIS — H34.11 CENTRAL RETINAL ARTERY OCCLUSION OF RIGHT EYE: ICD-10-CM

## 2022-04-01 DIAGNOSIS — M72.0 DUPUYTREN'S CONTRACTURE OF BOTH HANDS: ICD-10-CM

## 2022-04-01 DIAGNOSIS — E78.5 HYPERLIPIDEMIA, UNSPECIFIED HYPERLIPIDEMIA TYPE: ICD-10-CM

## 2022-04-01 DIAGNOSIS — I10 BENIGN ESSENTIAL HYPERTENSION: Primary | ICD-10-CM

## 2022-04-01 DIAGNOSIS — M81.0 OSTEOPOROSIS WITHOUT CURRENT PATHOLOGICAL FRACTURE, UNSPECIFIED OSTEOPOROSIS TYPE: ICD-10-CM

## 2022-04-01 PROCEDURE — 99214 OFFICE O/P EST MOD 30 MIN: CPT | Performed by: INTERNAL MEDICINE

## 2022-04-01 RX ORDER — LEVOTHYROXINE SODIUM 0.07 MG/1
TABLET ORAL
Qty: 90 TABLET | Refills: 1 | Status: SHIPPED | OUTPATIENT
Start: 2022-04-01 | End: 2022-07-20 | Stop reason: SDUPTHER

## 2022-04-01 RX ORDER — SIMVASTATIN 20 MG
TABLET ORAL
Qty: 90 TABLET | Refills: 1 | Status: SHIPPED | OUTPATIENT
Start: 2022-04-01 | End: 2022-07-20 | Stop reason: SDUPTHER

## 2022-04-01 RX ORDER — METOPROLOL TARTRATE 50 MG/1
50 TABLET, FILM COATED ORAL 2 TIMES DAILY
Qty: 180 TABLET | Refills: 1 | Status: SHIPPED | OUTPATIENT
Start: 2022-04-01 | End: 2022-07-20 | Stop reason: SDUPTHER

## 2022-04-01 RX ORDER — LISINOPRIL 20 MG/1
20 TABLET ORAL DAILY
Qty: 90 TABLET | Refills: 0 | Status: SHIPPED | OUTPATIENT
Start: 2022-04-01 | End: 2022-07-20 | Stop reason: SDUPTHER

## 2022-04-01 RX ORDER — AMLODIPINE BESYLATE 5 MG/1
TABLET ORAL
Qty: 90 TABLET | Refills: 1 | Status: SHIPPED | OUTPATIENT
Start: 2022-04-01 | End: 2022-07-20 | Stop reason: SDUPTHER

## 2022-04-01 ASSESSMENT — ENCOUNTER SYMPTOMS
NAUSEA: 0
SHORTNESS OF BREATH: 0
ABDOMINAL PAIN: 0
RHINORRHEA: 0
VOMITING: 0
WHEEZING: 0

## 2022-04-01 NOTE — PROGRESS NOTES
Subjective:      Patient ID: Adele Lowe is a 78 y.o. female. HPI  Patient is here for follow up of hypertension, hyperlipidemia, hypothyroidism, central retinal artery occlusion and osteoporosis. She went to the ER for elevated BP. Patient's BP is controlled on current medications. No CP or dyspnea. She has mild pedal edema. Her cholesterol is at goal. No myalgias. Patient's hypothyroidism is stable on replacement therapy. No diarrhea. She has mild constipation. She takes Metamucil. Her vision is unchanged. 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 She has osteoporosis. She is taking Calcium. No fracture issues. Is refusing any other treatment. Review of Systems   Constitutional: Negative for appetite change and fatigue. HENT: Negative for postnasal drip and rhinorrhea. Respiratory: Negative for shortness of breath and wheezing. Cardiovascular: Negative for chest pain and palpitations. Gastrointestinal: Negative for abdominal pain, nausea and vomiting. Endocrine: Negative for cold intolerance, heat intolerance and polyphagia. Musculoskeletal: Negative for joint swelling. Skin: Negative for rash. Neurological: Negative for light-headedness. Psychiatric/Behavioral: Negative for sleep disturbance. Current Outpatient Medications   Medication Instructions    amLODIPine (NORVASC) 5 MG tablet TAKE 1 TABLET BY MOUTH EVERY DAY    aspirin 81 mg, DAILY    calcium carbonate (OYSTER SHELL CALCIUM 500 MG) 1250 (500 Ca) MG tablet 1 tablet, Oral, DAILY    levothyroxine (SYNTHROID) 75 MCG tablet TAKE 1 TABLET BY MOUTH EVERY DAY    lisinopril (PRINIVIL;ZESTRIL) 20 mg, Oral, DAILY    metoprolol tartrate (LOPRESSOR) 50 mg, Oral, 2 TIMES DAILY    Multiple Vitamin (MULTI-VITAMIN) TABS Take  by mouth.       NONFORMULARY Indications: Chloracedin    simvastatin (ZOCOR) 20 MG tablet TAKE 1 TABLET BY MOUTH EVERY EVENING    vitamin D 1000 units CAPS Oral         There are no changes to past medical history, family history, social history or review of systems(except as noted in the history section) since prior note (all reviewed with patient). /80 (Site: Left Upper Arm, Position: Sitting, Cuff Size: Medium Adult)   Pulse 70   Resp 12   Ht 5' 7\" (1.702 m)   Wt 128 lb (58.1 kg)   BMI 20.05 kg/m²        Objective:   Physical Exam  Constitutional:       Appearance: She is well-developed. HENT:      Head: Normocephalic. Eyes:      Conjunctiva/sclera: Conjunctivae normal.      Pupils: Pupils are equal, round, and reactive to light. Neck:      Thyroid: No thyroid mass or thyromegaly. Vascular: No carotid bruit or JVD. Trachea: Trachea normal.   Cardiovascular:      Rate and Rhythm: Normal rate and regular rhythm. Heart sounds: Normal heart sounds. No murmur heard. No gallop. Pulmonary:      Effort: Pulmonary effort is normal. No respiratory distress. Breath sounds: Normal breath sounds. No wheezing or rales. Abdominal:      General: Bowel sounds are normal. There is no distension. Palpations: Abdomen is soft. There is no hepatomegaly, splenomegaly or mass. Tenderness: There is no abdominal tenderness. Musculoskeletal:      Cervical back: Normal range of motion and neck supple. Comments: Dupuytren's contracture both hands   Lymphadenopathy:      Cervical: No cervical adenopathy. Skin:     General: Skin is warm and dry. Findings: No rash. Neurological:      Mental Status: She is alert and oriented to person, place, and time. Psychiatric:         Behavior: Behavior normal.         Thought Content: Thought content normal.         Judgment: Judgment normal.         Assessment:       Diagnosis Orders   1. Benign essential hypertension     2. Central retinal artery occlusion of right eye     3. Dupuytren's contracture of both hands     4. Hyperlipidemia, unspecified hyperlipidemia type     5. Acquired hypothyroidism     6. Osteoporosis without current pathological fracture, unspecified osteoporosis type            Plan:         HTN: on Metoprolol and Lisinopril. Increase Lisinopril to 20 mg daily. Hypothyroidism:  Patient's hypothyroidism is stable on replacement therapy. Hyperlipidemia:  At goal. On Zocor. 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 Osteoporosis-  On calcium with vitamin D. Refuses other treatment. Dupuytren's contracture unchanged. Continue exercise. Decrease calorie intake. The current medical regimen is effective;  continue present plan and medications. See orders.

## 2022-07-20 ENCOUNTER — OFFICE VISIT (OUTPATIENT)
Dept: INTERNAL MEDICINE CLINIC | Age: 82
End: 2022-07-20

## 2022-07-20 VITALS
HEIGHT: 67 IN | HEART RATE: 70 BPM | SYSTOLIC BLOOD PRESSURE: 130 MMHG | WEIGHT: 124 LBS | RESPIRATION RATE: 12 BRPM | DIASTOLIC BLOOD PRESSURE: 80 MMHG | BODY MASS INDEX: 19.46 KG/M2

## 2022-07-20 DIAGNOSIS — M81.0 OSTEOPOROSIS WITHOUT CURRENT PATHOLOGICAL FRACTURE, UNSPECIFIED OSTEOPOROSIS TYPE: ICD-10-CM

## 2022-07-20 DIAGNOSIS — E03.9 ACQUIRED HYPOTHYROIDISM: ICD-10-CM

## 2022-07-20 DIAGNOSIS — H34.11 CENTRAL RETINAL ARTERY OCCLUSION OF RIGHT EYE: ICD-10-CM

## 2022-07-20 DIAGNOSIS — I10 BENIGN ESSENTIAL HYPERTENSION: Primary | ICD-10-CM

## 2022-07-20 DIAGNOSIS — M72.0 DUPUYTREN'S CONTRACTURE OF BOTH HANDS: ICD-10-CM

## 2022-07-20 DIAGNOSIS — I10 BENIGN ESSENTIAL HYPERTENSION: ICD-10-CM

## 2022-07-20 DIAGNOSIS — E78.5 HYPERLIPIDEMIA, UNSPECIFIED HYPERLIPIDEMIA TYPE: ICD-10-CM

## 2022-07-20 DIAGNOSIS — M65.30 TRIGGER FINGER, ACQUIRED: ICD-10-CM

## 2022-07-20 LAB
A/G RATIO: 1.8 (ref 1.1–2.2)
ALBUMIN SERPL-MCNC: 4.9 G/DL (ref 3.4–5)
ALP BLD-CCNC: 55 U/L (ref 40–129)
ALT SERPL-CCNC: 13 U/L (ref 10–40)
ANION GAP SERPL CALCULATED.3IONS-SCNC: 10 MMOL/L (ref 3–16)
AST SERPL-CCNC: 24 U/L (ref 15–37)
BASOPHILS ABSOLUTE: 0 K/UL (ref 0–0.2)
BASOPHILS RELATIVE PERCENT: 0.4 %
BILIRUB SERPL-MCNC: 0.3 MG/DL (ref 0–1)
BUN BLDV-MCNC: 9 MG/DL (ref 7–20)
CALCIUM SERPL-MCNC: 9.5 MG/DL (ref 8.3–10.6)
CHLORIDE BLD-SCNC: 90 MMOL/L (ref 99–110)
CHOLESTEROL, TOTAL: 189 MG/DL (ref 0–199)
CO2: 27 MMOL/L (ref 21–32)
CREAT SERPL-MCNC: 0.8 MG/DL (ref 0.6–1.2)
EOSINOPHILS ABSOLUTE: 0.1 K/UL (ref 0–0.6)
EOSINOPHILS RELATIVE PERCENT: 2.4 %
GFR AFRICAN AMERICAN: >60
GFR NON-AFRICAN AMERICAN: >60
GLUCOSE BLD-MCNC: 88 MG/DL (ref 70–99)
HCT VFR BLD CALC: 39.5 % (ref 36–48)
HDLC SERPL-MCNC: 67 MG/DL (ref 40–60)
HEMOGLOBIN: 13.5 G/DL (ref 12–16)
LDL CHOLESTEROL CALCULATED: 101 MG/DL
LYMPHOCYTES ABSOLUTE: 1.6 K/UL (ref 1–5.1)
LYMPHOCYTES RELATIVE PERCENT: 26.2 %
MCH RBC QN AUTO: 29.1 PG (ref 26–34)
MCHC RBC AUTO-ENTMCNC: 34.1 G/DL (ref 31–36)
MCV RBC AUTO: 85.3 FL (ref 80–100)
MONOCYTES ABSOLUTE: 0.7 K/UL (ref 0–1.3)
MONOCYTES RELATIVE PERCENT: 12.5 %
NEUTROPHILS ABSOLUTE: 3.5 K/UL (ref 1.7–7.7)
NEUTROPHILS RELATIVE PERCENT: 58.5 %
PDW BLD-RTO: 13.4 % (ref 12.4–15.4)
PLATELET # BLD: 291 K/UL (ref 135–450)
PMV BLD AUTO: 8.2 FL (ref 5–10.5)
POTASSIUM SERPL-SCNC: 5.1 MMOL/L (ref 3.5–5.1)
RBC # BLD: 4.63 M/UL (ref 4–5.2)
SODIUM BLD-SCNC: 127 MMOL/L (ref 136–145)
TOTAL PROTEIN: 7.6 G/DL (ref 6.4–8.2)
TRIGL SERPL-MCNC: 104 MG/DL (ref 0–150)
TSH SERPL DL<=0.05 MIU/L-ACNC: 1.49 UIU/ML (ref 0.27–4.2)
URIC ACID, SERUM: 4.4 MG/DL (ref 2.6–6)
VLDLC SERPL CALC-MCNC: 21 MG/DL
WBC # BLD: 5.9 K/UL (ref 4–11)

## 2022-07-20 PROCEDURE — 1123F ACP DISCUSS/DSCN MKR DOCD: CPT | Performed by: INTERNAL MEDICINE

## 2022-07-20 PROCEDURE — 99214 OFFICE O/P EST MOD 30 MIN: CPT | Performed by: INTERNAL MEDICINE

## 2022-07-20 RX ORDER — SIMVASTATIN 20 MG
TABLET ORAL
Qty: 90 TABLET | Refills: 1 | Status: SHIPPED | OUTPATIENT
Start: 2022-07-20 | End: 2022-10-14 | Stop reason: SDUPTHER

## 2022-07-20 RX ORDER — LISINOPRIL 20 MG/1
20 TABLET ORAL DAILY
Qty: 90 TABLET | Refills: 1 | Status: SHIPPED | OUTPATIENT
Start: 2022-07-20 | End: 2022-10-14 | Stop reason: SDUPTHER

## 2022-07-20 RX ORDER — METOPROLOL TARTRATE 50 MG/1
50 TABLET, FILM COATED ORAL 2 TIMES DAILY
Qty: 180 TABLET | Refills: 1 | Status: SHIPPED | OUTPATIENT
Start: 2022-07-20 | End: 2022-10-14 | Stop reason: SDUPTHER

## 2022-07-20 RX ORDER — LEVOTHYROXINE SODIUM 0.07 MG/1
TABLET ORAL
Qty: 90 TABLET | Refills: 1 | Status: SHIPPED | OUTPATIENT
Start: 2022-07-20 | End: 2022-10-14 | Stop reason: SDUPTHER

## 2022-07-20 RX ORDER — AMLODIPINE BESYLATE 5 MG/1
TABLET ORAL
Qty: 90 TABLET | Refills: 1 | Status: SHIPPED | OUTPATIENT
Start: 2022-07-20 | End: 2022-10-14 | Stop reason: SDUPTHER

## 2022-07-20 ASSESSMENT — ENCOUNTER SYMPTOMS
SHORTNESS OF BREATH: 0
VOMITING: 0
RHINORRHEA: 0
ABDOMINAL PAIN: 0
WHEEZING: 0
NAUSEA: 0

## 2022-07-20 NOTE — PROGRESS NOTES
Subjective:      Patient ID: Laurel Greco is a 78 y.o. female. HPI  Patient is here for follow up of hypertension, hyperlipidemia, hypothyroidism, central retinal artery occlusion and osteoporosis. She went to the ER for elevated BP. Patient's BP is controlled on current medications. No CP or dyspnea. She has minimal pedal edema. Her cholesterol is at goal. No myalgias. Patient's hypothyroidism is stable on replacement therapy. No diarrhea. She has mild constipation. She takes Metamucil. No heat or cold intolerance. Her vision is unchanged. 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 She has osteoporosis. She is taking Calcium. No fracture issues. Is refusing any other treatment. Review of Systems   Constitutional:  Negative for appetite change and fatigue. HENT:  Negative for postnasal drip and rhinorrhea. Respiratory:  Negative for shortness of breath and wheezing. Cardiovascular:  Negative for chest pain and palpitations. Gastrointestinal:  Negative for abdominal pain, nausea and vomiting. Musculoskeletal:  Negative for joint swelling. Skin:  Negative for rash. Neurological:  Negative for light-headedness. Psychiatric/Behavioral:  Negative for sleep disturbance. Current Outpatient Medications   Medication Instructions    amLODIPine (NORVASC) 5 MG tablet TAKE 1 TABLET BY MOUTH EVERY DAY    aspirin 81 mg, DAILY    calcium carbonate (OYSTER SHELL CALCIUM 500 MG) 1250 (500 Ca) MG tablet 1 tablet, Oral, DAILY    levothyroxine (SYNTHROID) 75 MCG tablet TAKE 1 TABLET BY MOUTH EVERY DAY    lisinopril (PRINIVIL;ZESTRIL) 20 mg, Oral, DAILY    metoprolol tartrate (LOPRESSOR) 50 mg, Oral, 2 TIMES DAILY    Multiple Vitamin (MULTI-VITAMIN) TABS Take  by mouth.       NONFORMULARY Indications: Chloracedin    simvastatin (ZOCOR) 20 MG tablet TAKE 1 TABLET BY MOUTH EVERY EVENING    vitamin D 1000 units CAPS Oral         There are no changes to past medical history, family history, social history or review of systems(except as noted in the history section) since prior note (all reviewed with patient). /80 (Site: Right Upper Arm, Position: Sitting, Cuff Size: Medium Adult)   Pulse 70   Resp 12   Ht 5' 7\" (1.702 m)   Wt 124 lb (56.2 kg)   BMI 19.42 kg/m²        Objective:   Physical Exam  Constitutional:       Appearance: She is well-developed. HENT:      Head: Normocephalic. Eyes:      Conjunctiva/sclera: Conjunctivae normal.      Pupils: Pupils are equal, round, and reactive to light. Neck:      Thyroid: No thyroid mass or thyromegaly. Vascular: No carotid bruit or JVD. Trachea: Trachea normal.   Cardiovascular:      Rate and Rhythm: Normal rate and regular rhythm. Heart sounds: Normal heart sounds. No murmur heard. No gallop. Pulmonary:      Effort: Pulmonary effort is normal. No respiratory distress. Breath sounds: Normal breath sounds. No wheezing or rales. Abdominal:      General: Bowel sounds are normal. There is no distension. Palpations: Abdomen is soft. There is no hepatomegaly, splenomegaly or mass. Tenderness: There is no abdominal tenderness. Musculoskeletal:         General: Normal range of motion. Cervical back: Normal range of motion and neck supple. Lymphadenopathy:      Cervical: No cervical adenopathy. Skin:     General: Skin is warm and dry. Findings: No rash. Neurological:      Mental Status: She is alert and oriented to person, place, and time. Cranial Nerves: No cranial nerve deficit. Deep Tendon Reflexes: Reflexes are normal and symmetric. Psychiatric:         Behavior: Behavior normal.         Thought Content: Thought content normal.         Judgment: Judgment normal.       Assessment:       Diagnosis Orders   1. Benign essential hypertension  CBC with Auto Differential    Comprehensive Metabolic Panel    Lipid Panel    Uric Acid      2. Acquired hypothyroidism  TSH      3.  Central retinal artery occlusion of right eye        4. Hyperlipidemia, unspecified hyperlipidemia type        5. Trigger finger, acquired        6. Osteoporosis without current pathological fracture, unspecified osteoporosis type        7. Dupuytren's contracture of both hands               Plan:         HTN: on Metoprolol and Lisinopril. Continue Lisinopril to 20 mg daily. Hypothyroidism:  Patient's hypothyroidism is stable on replacement therapy. Hyperlipidemia:  At goal. On Zocor. 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 Osteoporosis-  On calcium with vitamin D. Refuses other treatment. Dupuytren's contracture unchanged. Continue exercise. Decrease calorie intake. The current medical regimen is effective;  continue present plan and medications. See orders.

## 2022-07-21 DIAGNOSIS — E87.1 HYPONATREMIA: Primary | ICD-10-CM

## 2022-08-16 ENCOUNTER — HOSPITAL ENCOUNTER (OUTPATIENT)
Age: 82
Discharge: HOME | End: 2022-08-16
Payer: COMMERCIAL

## 2022-08-16 DIAGNOSIS — E87.1 HYPONATREMIA: ICD-10-CM

## 2022-08-16 LAB
ANION GAP SERPL CALCULATED.3IONS-SCNC: 9 MMOL/L (ref 3–16)
BUN BLDV-MCNC: 11 MG/DL (ref 7–20)
CALCIUM SERPL-MCNC: 9.6 MG/DL (ref 8.3–10.6)
CHLORIDE BLD-SCNC: 91 MMOL/L (ref 99–110)
CO2: 28 MMOL/L (ref 21–32)
CREAT SERPL-MCNC: 0.9 MG/DL (ref 0.6–1.2)
GFR AFRICAN AMERICAN: >60
GFR NON-AFRICAN AMERICAN: 60
GLUCOSE BLD-MCNC: 99 MG/DL (ref 70–99)
POTASSIUM SERPL-SCNC: 4.8 MMOL/L (ref 3.5–5.1)
SODIUM BLD-SCNC: 128 MMOL/L (ref 136–145)

## 2022-08-16 PROCEDURE — 80048 BASIC METABOLIC PNL TOTAL CA: CPT

## 2022-08-16 PROCEDURE — 36415 COLL VENOUS BLD VENIPUNCTURE: CPT

## 2022-08-23 ENCOUNTER — TELEPHONE (OUTPATIENT)
Dept: INTERNAL MEDICINE CLINIC | Age: 82
End: 2022-08-23

## 2022-08-23 NOTE — TELEPHONE ENCOUNTER
----- Message from Tommy Quiroz MD sent at 8/23/2022  3:01 PM EDT -----  Contact: [phone]  Check oxygen level. Call if worse. ? Paxlovid  ----- Message -----  From: Marly Brennan  Sent: 8/23/2022   1:28 PM EDT  To: Tommy Quiroz MD    Patient started Sunday with symptoms of cough and feeling very lousy. She tested today and is positive for covid. States that she seems better today, and wanted to know what she needs to do?

## 2022-08-23 NOTE — TELEPHONE ENCOUNTER
Per Dr Boston Braden patient being prescribed Paxlovid 20 mg. Patient informed and voiced understanding to stop taking simvastatin for 10 days.

## 2022-10-04 ENCOUNTER — HOSPITAL ENCOUNTER (EMERGENCY)
Age: 82
Discharge: HOME | End: 2022-10-04
Payer: COMMERCIAL

## 2022-10-04 ENCOUNTER — APPOINTMENT (OUTPATIENT)
Dept: CT IMAGING | Age: 82
End: 2022-10-04
Payer: COMMERCIAL

## 2022-10-04 ENCOUNTER — APPOINTMENT (OUTPATIENT)
Dept: GENERAL RADIOLOGY | Age: 82
End: 2022-10-04
Payer: COMMERCIAL

## 2022-10-04 VITALS
WEIGHT: 128 LBS | HEART RATE: 72 BPM | TEMPERATURE: 97.6 F | RESPIRATION RATE: 16 BRPM | BODY MASS INDEX: 20.09 KG/M2 | HEIGHT: 67 IN | OXYGEN SATURATION: 100 % | SYSTOLIC BLOOD PRESSURE: 175 MMHG | DIASTOLIC BLOOD PRESSURE: 82 MMHG

## 2022-10-04 DIAGNOSIS — S01.81XA FACIAL LACERATION, INITIAL ENCOUNTER: ICD-10-CM

## 2022-10-04 DIAGNOSIS — S00.83XA FACIAL CONTUSION, INITIAL ENCOUNTER: ICD-10-CM

## 2022-10-04 DIAGNOSIS — T14.8XXA FOREIGN BODY IN WOUND OF SKIN: ICD-10-CM

## 2022-10-04 DIAGNOSIS — S02.2XXA CLOSED FRACTURE OF NASAL BONE, INITIAL ENCOUNTER: Primary | ICD-10-CM

## 2022-10-04 DIAGNOSIS — S80.02XA CONTUSION OF LEFT KNEE, INITIAL ENCOUNTER: ICD-10-CM

## 2022-10-04 DIAGNOSIS — S00.31XA ABRASION OF NOSE, INITIAL ENCOUNTER: ICD-10-CM

## 2022-10-04 DIAGNOSIS — S60.511A ABRASION OF RIGHT HAND AND FINGERS, INITIAL ENCOUNTER: ICD-10-CM
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
## 2022-10-04 DIAGNOSIS — S09.90XA CLOSED HEAD INJURY, INITIAL ENCOUNTER: ICD-10-CM

## 2022-10-04 DIAGNOSIS — S60.419A ABRASION OF RIGHT HAND AND FINGERS, INITIAL ENCOUNTER: ICD-10-CM

## 2022-10-04 PROCEDURE — 6360000002 HC RX W HCPCS: Performed by: PHYSICIAN ASSISTANT

## 2022-10-04 PROCEDURE — 73130 X-RAY EXAM OF HAND: CPT

## 2022-10-04 PROCEDURE — 90715 TDAP VACCINE 7 YRS/> IM: CPT | Performed by: PHYSICIAN ASSISTANT

## 2022-10-04 PROCEDURE — 90471 IMMUNIZATION ADMIN: CPT | Performed by: PHYSICIAN ASSISTANT

## 2022-10-04 PROCEDURE — 99284 EMERGENCY DEPT VISIT MOD MDM: CPT

## 2022-10-04 PROCEDURE — 70450 CT HEAD/BRAIN W/O DYE: CPT

## 2022-10-04 PROCEDURE — 70160 X-RAY EXAM OF NASAL BONES: CPT

## 2022-10-04 PROCEDURE — 12014 RPR F/E/E/N/L/M 5.1-7.5 CM: CPT

## 2022-10-04 PROCEDURE — 6370000000 HC RX 637 (ALT 250 FOR IP): Performed by: PHYSICIAN ASSISTANT

## 2022-10-04 RX ORDER — ACETAMINOPHEN 325 MG/1
650 TABLET ORAL ONCE
Status: COMPLETED | OUTPATIENT
Start: 2022-10-04 | End: 2022-10-04

## 2022-10-04 RX ORDER — AMOXICILLIN AND CLAVULANATE POTASSIUM 875; 125 MG/1; MG/1
1 TABLET, FILM COATED ORAL 2 TIMES DAILY
Qty: 14 TABLET | Refills: 0 | Status: SHIPPED | OUTPATIENT
Start: 2022-10-04 | End: 2022-10-11

## 2022-10-04 RX ORDER — AMOXICILLIN AND CLAVULANATE POTASSIUM 875; 125 MG/1; MG/1
1 TABLET, FILM COATED ORAL ONCE
Status: COMPLETED | OUTPATIENT
Start: 2022-10-04 | End: 2022-10-04

## 2022-10-04 RX ADMIN — AMOXICILLIN AND CLAVULANATE POTASSIUM 1 TABLET: 875; 125 TABLET, FILM COATED ORAL at 17:23

## 2022-10-04 RX ADMIN — TETANUS TOXOID, REDUCED DIPHTHERIA TOXOID AND ACELLULAR PERTUSSIS VACCINE, ADSORBED 0.5 ML: 5; 2.5; 8; 8; 2.5 SUSPENSION INTRAMUSCULAR at 15:29

## 2022-10-04 RX ADMIN — ACETAMINOPHEN 650 MG: 325 TABLET ORAL at 15:29

## 2022-10-04 ASSESSMENT — ENCOUNTER SYMPTOMS
VOMITING: 0
SHORTNESS OF BREATH: 0
FACIAL SWELLING: 1
BACK PAIN: 0
VISUAL CHANGE: 0
ABDOMINAL PAIN: 0

## 2022-10-04 ASSESSMENT — PAIN SCALES - GENERAL: PAINLEVEL_OUTOF10: 3

## 2022-10-04 ASSESSMENT — PAIN - FUNCTIONAL ASSESSMENT: PAIN_FUNCTIONAL_ASSESSMENT: 0-10

## 2022-10-04 ASSESSMENT — PAIN DESCRIPTION - PAIN TYPE: TYPE: ACUTE PAIN

## 2022-10-04 NOTE — ED PROVIDER NOTES
[facility]        Pt Name: Bear Mederos  MRN: [id number]  [place] 1940  Date of evaluation: 10/4/2022  Provider: Pio Laura PA-C  PCP: Sherilyn Canavan, MD    Shared Visit or Autonomous Visit: WYATT. I have evaluated this patient. My supervising physician was available for consultation. CHIEF COMPLAINT       Chief Complaint   Patient presents with    Laceration     Laceration to forehead. Patient states she was playing outside with her grand daughter when she tripped over her feet and hit her head on concrete. Denies LOC. Also has abrasions to bilateral hands         HISTORY OF PRESENT ILLNESS   (Location/Symptom, Timing/Onset, Context/Setting, Quality, Duration, Modifying Factors, Severity)  Note limiting factors. Bear Mederos is a 78 y.o. female presenting to the emergency department for evaluation after fall. She was playing with children outside she tripped and fell forward hitting her face on the concrete. She has laceration to her forehead. Bruise and swelling to forehead and nose. Scraped her hands and knees. No LOC. No vomiting. Denies any dizziness. Mild pain. Unsure of last tetanus. The history is provided by the patient. Fall  The fall occurred while walking. She landed on [place]. The volume of blood lost was minimal. The point of impact was the head (hands, knees). She was Ambulatory at the scene. There was No entrapment after the fall. Pertinent negatives include no visual change, no fever, no numbness, no abdominal pain, no vomiting, no headaches and no loss of consciousness. Nursing Notes were reviewed    REVIEW OF SYSTEMS    (2-9 systems for level 4, 10 or more for level 5)     Review of Systems   Constitutional:  Negative for fever. HENT:  Positive for facial swelling. Eyes:  Negative for visual disturbance. Respiratory:  Negative for shortness of breath.     Cardiovascular:  Negative for chest pain. Gastrointestinal:  Negative for abdominal pain and vomiting. Musculoskeletal:  Negative for back pain and neck pain. Right hand pain. Knee pain. Skin:  Positive for wound. Neurological:  Negative for dizziness, loss of consciousness, syncope, numbness and headaches. Psychiatric/Behavioral:  Negative for confusion. Positives and Pertinent negatives as per HPI. PAST MEDICAL HISTORY     Past Medical History:   Diagnosis Date    Benign essential hypertension 11/15/2017    Central retinal artery occlusion     right eye x3    Dupuytren's contracture of both hands 3/14/2014    Hyperlipidemia     Hypothyroidism     Mitral valve disease     Osteoporosis     Osteoporosis without current pathological fracture 8/9/2017    Other musculoskeletal symptoms referable to limbs(729.89)     Trigger finger (acquired)          SURGICAL HISTORY     Past Surgical History:   Procedure Laterality Date    COLONOSCOPY  1/11/2006    COLONOSCOPY N/A 7/8/2020    COLON W/ANES. (10:00) performed by Yolis Acosta MD at [address] [facility] 3/23/2022    COLON W/ANES. (10:00) performed by Yolis Acosta MD at [address] [facility]  7/22/11    cataract left eye    HAND SURGERY Left 09/28/2017    Left Dupuytrens disease excision from thumb and index finger    HYSTERECTOMY (CERVIX STATUS UNKNOWN)      OTHER SURGICAL HISTORY  03/23/2022    COLON W/ANES         CURRENTMEDICATIONS       Previous Medications    AMLODIPINE (NORVASC) 5 MG TABLET    TAKE 1 TABLET BY MOUTH EVERY DAY    ASPIRIN 81 MG EC TABLET    Take 81 mg by mouth daily. CALCIUM CARBONATE (OYSTER SHELL CALCIUM 500 MG) 1250 (500 CA) MG TABLET    Take 1 tablet by mouth daily    LEVOTHYROXINE (SYNTHROID) 75 MCG TABLET    TAKE 1 TABLET BY MOUTH EVERY DAY    LISINOPRIL (PRINIVIL;ZESTRIL) 20 MG TABLET    Take 1 tablet by mouth in the morning.     METOPROLOL TARTRATE (LOPRESSOR) 50 MG TABLET    Take 1 tablet by mouth in the morning and 1 tablet before bedtime. MULTIPLE VITAMIN (MULTI-VITAMIN) TABS    Take  by mouth. NONFORMULARY    Indications: Chloracedin    SIMVASTATIN (ZOCOR) 20 MG TABLET    TAKE 1 TABLET BY MOUTH EVERY EVENING    VITAMIN D 1000 UNITS CAPS    Take by mouth         ALLERGIES     Latex    FAMILYHISTORY       Family History   Problem Relation Age of Onset    Breast Cancer Sister     Cancer Sister         Thyroid/Uterine    Diabetes Sister     Thyroid Disease Sister           SOCIAL HISTORY       Social History     Socioeconomic History    Marital status:      Spouse name: None    Number of children: 6    Years of education: None    Highest education level: None   Tobacco Use    Smoking status: Never    Smokeless tobacco: Never   Vaping Use    Vaping Use: Never used   Substance and Sexual Activity    Alcohol use: No    Drug use: No    Sexual activity: Not Currently     Partners: Male       SCREENINGS    [place] Coma Scale  Eye Opening: Spontaneous  Best Verbal Response: Oriented  Best Motor Response: Obeys commands  [place] Coma Scale Score: 15        PHYSICAL EXAM    (up to 7 for level 4, 8 or more for level 5)     ED Triage Vitals   BP Temp Temp Source Heart Rate Resp SpO2 Height Weight   10/04/22 1512 10/04/22 1510 10/04/22 1510 10/04/22 1510 10/04/22 1510 10/04/22 1510 10/04/22 1510 10/04/22 1510   (!) 175/82 97.6 °F (36.4 °C) Oral 72 16 100 % 5' 7\" (1.702 m) 128 lb (58.1 kg)       Physical Exam  Vitals and nursing note reviewed. Constitutional:       Appearance: She is well-developed. She is not toxic-appearing. HENT:      Head: Normocephalic. Contusion and laceration present. No Stuart's sign. Comments: Transverse laceration across mid forehead with bruising and swelling mid forehead and nasal bridge. Abrasion to nasal bridge. Bleeding controlled. No bony step-offs. No epistaxis or septal wall hematoma. PERRL. Normal EOMs. No hemotympanum.   No cervical spine tenderness. Right Ear: Tympanic membrane and ear canal normal. No hemotympanum. Left Ear: Tympanic membrane and ear canal normal. No hemotympanum. Mouth/Throat:      Mouth: Mucous membranes are moist.      Pharynx: Oropharynx is clear. No oropharyngeal exudate or posterior oropharyngeal erythema. Eyes:      Extraocular Movements: Extraocular movements intact. Conjunctiva/sclera: Conjunctivae normal.      Pupils: Pupils are equal, round, and reactive to light. Neck:      Vascular: No JVD. Cardiovascular:      Rate and Rhythm: Normal rate and regular rhythm. Pulses:           Radial pulses are 2+ on the right side and 2+ on the left side. Posterior tibial pulses are 2+ on the right side and 2+ on the left side. Pulmonary:      Effort: Pulmonary effort is normal. No respiratory distress. Breath sounds: Normal breath sounds. No wheezing or rales. Abdominal:      General: Bowel sounds are normal. There is no distension. Palpations: Abdomen is soft. Abdomen is not rigid. There is no mass. Tenderness: There is no abdominal tenderness. There is no guarding or rebound. Musculoskeletal:         General: Normal range of motion. Right hand: Swelling and tenderness present. Normal sensation. Normal capillary refill. Normal pulse. Cervical back: Normal range of motion and neck supple. No tenderness or bony tenderness. Normal range of motion. Comments: Tenderness bruise and swelling to right palm thenar eminence area. Abrasions to hand and pinky finger. Neurovascular intact. Small abrasion to left palm nontender. Small bruise to anterior left knee no bony tenderness. Ambulatory. Neurovascular intact. Skin:     General: Skin is warm and dry. Findings: No rash. Neurological:      Mental Status: She is alert and oriented to person, place, and time. GCS: GCS eye subscore is 4. GCS verbal subscore is 5. GCS motor subscore is 6.       Cranial Nerves: No cranial nerve deficit. Sensory: Sensation is intact. No sensory deficit. Motor: Motor function is intact. No abnormal muscle tone. Coordination: Coordination normal.   Psychiatric:         Behavior: Behavior normal. Behavior is cooperative. DIAGNOSTIC RESULTS   LABS:    Labs Reviewed - No data to display    No results found for this visit on 10/04/22. All other labs were not returned as of this dictation. EKG: All EKG's are interpreted by the Emergency Department Physician in the absence of a cardiologist.  Please see their note for interpretation of EKG. RADIOLOGY:   Non-plain film images such as CT, Ultrasound and MRI are read by the radiologist. Plain radiographic images are visualized andpreliminarily interpreted by the  ED Provider with the below findings:      Interpretation [facility] Radiologist below, if available at the time of this note:    XR HAND RIGHT (MIN 3 VIEWS)   Final Result   No acute fracture or dislocation. XR NASAL BONE (MIN 3 VIEWS )   Final Result   Depressed nasal bone fracture, possibly acute. Correlate clinically. There is overlying soft tissue swelling and irregularity suggesting a   laceration. Suspect particulate foreign matter on or just beneath the skin surface. CT HEAD WO CONTRAST   Preliminary Result   No evidence of acute intracranial abnormality. XR NASAL BONE (MIN 3 VIEWS )    Result Date: 10/4/2022  EXAMINATION: THREE XRAY VIEWS OF THE NASAL BONES 10/4/2022 3:31 pm COMPARISON: None. HISTORY: ORDERING SYSTEM PROVIDED HISTORY: fall r/o fx TECHNOLOGIST PROVIDED HISTORY: Reason for exam:->fall r/o fx Reason for Exam: fall today, LAC to forehead, swelling to to her nose, abrasions on the palm of her RT hand FINDINGS: There is a depressed nasal bone fracture of indeterminate age. Adjacent soft tissue swelling and irregularity suggests an acute age.   There are particulate radiopacities in the superficial soft tissues, possibly foreign matter. Nasal septum is in the midline. Maxillary sinuses are clear. Depressed nasal bone fracture, possibly acute. Correlate clinically. There is overlying soft tissue swelling and irregularity suggesting a laceration. Suspect particulate foreign matter on or just beneath the skin surface. XR HAND RIGHT (MIN 3 VIEWS)    Result Date: 10/4/2022  EXAMINATION: THREE XRAY VIEWS OF THE RIGHT HAND 10/4/2022 3:31 pm COMPARISON: None. HISTORY: ORDERING SYSTEM PROVIDED HISTORY: fall r/o fx TECHNOLOGIST PROVIDED HISTORY: Reason for exam:->fall r/o fx Reason for Exam: fall today, LAC to forehead, swelling to to her nose, abrasions on the palm of her RT hand FINDINGS: Mild right hand soft tissue swelling. There are minimal degenerative changes for patient age. No acute fracture or dislocation. No radiopaque foreign body. No acute fracture or dislocation. CT HEAD WO CONTRAST    Result Date: 10/4/2022  EXAMINATION: CT OF THE HEAD WITHOUT CONTRAST  10/4/2022 3:30 pm TECHNIQUE: CT of the head was performed without the administration of intravenous contrast. Automated exposure control, iterative reconstruction, and/or weight based adjustment of the mA/kV was utilized to reduce the radiation dose to as low as reasonably achievable. COMPARISON: Prior studies most recent 10/09/2018. HISTORY: ORDERING SYSTEM PROVIDED HISTORY: head injury TECHNOLOGIST PROVIDED HISTORY: Has a \"code stroke\" or \"stroke alert\" been called? ->No Reason for exam:->head injury Decision Support Exception - unselect if not a suspected or confirmed emergency medical condition->Emergency Medical Condition (MA) Reason for Exam: fall today, LAC to forehead, swelling to to her nose, abrasions on the palm of her RT hand FINDINGS: BRAIN/VENTRICLES: Mild motion/streak artifact is present on the examination. The ventricular system is unchanged in appearance. No evidence of mass effect or midline shift.   Prominence of sulci overlying convexities of cerebral hemispheres and cerebellum consistent with atrophy. Mild abnormal low-attenuation identified in periventricular/subcortical white matter. Finding nonspecific however likely related to changes of mild chronic ischemic leukoencephalopathy. There is suggestion of presence of 5 mm pineal gland cyst (image 25). No abnormal extra-axial fluid collection is identified. There is minimal atherosclerotic calcification of distal internal carotid arteries. ORBITS: The visualized portion of the orbits demonstrate no acute abnormality. SINUSES: The visualized paranasal sinuses and mastoid air cells demonstrate no acute abnormality. SOFT TISSUES/SKULL:  There is subtle induration of subcutaneous soft tissues near the ridge of the nose which may be related to patient's known trauma. No underlying skull fracture is identified. No evidence of acute intracranial abnormality.             PROCEDURES   Unless otherwise noted below, none     Lac Repair    Date/Time: 10/4/2022 5:11 PM  Performed by: Jaylin Marrero PA-C  Authorized by: Jaylin Marrero PA-C     Consent:     Consent obtained:  Verbal    Consent given by:  Patient    Risks, benefits, and alternatives were discussed: yes      Risks discussed:  Infection, pain, poor cosmetic result, poor wound healing and retained foreign body  Universal protocol:     Procedure explained and questions answered to patient or proxy's satisfaction: yes      Patient identity confirmed:  Verbally with patient  Anesthesia:     Anesthesia method:  Local infiltration    Local anesthetic:  Lidocaine 1% w/o epi (3cc)  Laceration details:     Length (cm):  7 (T shaped)    Depth (mm):  4  Pre-procedure details:     Preparation:  Patient was prepped and draped in usual sterile fashion and imaging obtained to evaluate for foreign bodies  Exploration:     Wound extent: foreign bodies/material      Wound extent: no fascia violation noted and no vascular damage noted Foreign bodies/material:  Several tiny pieces of gravel removed from wounds    Contaminated: yes    Treatment:     Area cleansed with:  Saline and chlorhexidine    Amount of cleaning:  Standard  Skin repair:     Repair method:  Sutures    Suture size:  5-0    Suture material:  Prolene    Suture technique:  Simple interrupted    Number of sutures:  14  Approximation:     Approximation:  Close  Repair type:     Repair type:  Simple  Post-procedure details:     Dressing:  Antibiotic ointment    Procedure completion:  Tolerated well, no immediate complications    CRITICAL CARE TIME   Critical care provided for this patient of which 0 min were spend on critical care and decision making. 0 min spent on procedures. There was imminent failure of an organ system which required critical intervention to prevent clinically significant progression of life threatening deterioration of the patient's condition to the point of disability or death. CONSULTS:  None      EMERGENCY DEPARTMENT COURSE and DIFFERENTIAL DIAGNOSIS/MDM:   Vitals:    Vitals:    10/04/22 1510 10/04/22 1512   BP:  (!) 175/82   Pulse: 72    Resp: 16    Temp: 97.6 °F (36.4 °C)    TempSrc: Oral    SpO2: 100%    Weight: 128 lb (58.1 kg)    Height: 5' 7\" (1.702 m)        Patient was given thefollowing medications:  Medications   amoxicillin-clavulanate (AUGMENTIN) 875-125 MG per tablet 1 tablet (has no administration in time range)   acetaminophen (TYLENOL) tablet 650 mg (650 mg Oral Given 10/4/22 1529)   Tetanus-Diphth-Acell Pertussis (BOOSTRIX) injection 0.5 mL (0.5 mLs IntraMUSCular Given 10/4/22 1529)       Is this patient to be included in the SEP-1 Core Measure due to severe sepsis or septic shock? No   Exclusion criteria - the patient is NOT to be included for SEP-1 Core Measure due to: Infection is not suspected       ED course  Patient presented to the ER for evaluation after fall outside from standing height hit her face hands and knees.   She has a large T-shaped laceration transverse across the forehead and down proximal nose. Bleeding controlled. Several tiny pieces of gravel at wound site were cleared away no additional foreign matter seen on careful inspection but I did discuss with her possibility of retained foreign matter and need to return for any worsening symptoms or signs of infection she understands. Wound was cleaned and irrigated and skin sutured suture mobile in 5 to 7 days. CT brain of head no acute intracranial normality no hemorrhage no skull fracture. She does have nasal bone fracture. I did cover her with antibiotics due to wounds on her nose and face and having a fracture. Right hand x-ray negative for fracture. She had a few small wounds to her hand abrasions and skin tears with small skin tags were removed, cleaned and bandaged. She does have a bruise on her left knee she declines an x-ray of her knee states is just sore does not feel it is broken has been ambulatory. She is overall well-appearing here. Family at bedside. Feels comfortable going home. Head injury instructions provided. Tetanus updated here. Advise close follow-up with her doctor and returning to the ER for any worsening symptoms. They understand and agree. I estimate there is LOW risk for SKULL FRACTURE, SUBARACHNOID HEMORRHAGE, INTRACRANIAL HEMORRHAGE, CERVICAL SPINE INJURY, SUBDURAL OR EPIDURAL HEMATOMA,  thus I consider the discharge disposition reasonable. FINAL IMPRESSION      1. Closed fracture of nasal bone, initial encounter    2. Facial laceration, initial encounter    3. Facial contusion, initial encounter    4. Abrasion of nose, initial encounter    5. Abrasion of right hand and fingers, initial encounter    6. Contusion of left knee, initial encounter    7. Closed head injury, initial encounter    8. Fall on same level from tripping    9.  Foreign body in wound of skin          DISPOSITION/PLAN   DISPOSITION Decision to Discharge    PATIENT REFERREDTO:  Kayla Salcedo MD  [address] , [address] [facility]  [phone]    Call in 1 day  Call for follow-up appointment from Allyn Godoy 19 visit    [place].  [facility] Emergency Department  [address] [address]  [phone]    If symptoms worsen    Demetris Lares MD  [address] [facility]  [facility]   [address]  [phone]      ENT as needed for nasal fracture    DISCHARGE MEDICATIONS:  New Prescriptions    AMOXICILLIN-CLAVULANATE (AUGMENTIN) 875-125 MG PER TABLET    Take 1 tablet by mouth 2 times daily for 7 days       DISCONTINUED MEDICATIONS:  Discontinued Medications    No medications on file              (Please note that portions ofthis note were completed with a voice recognition program.  Efforts were made to edit the dictations but occasionally words are mis-transcribed.)    Joycelyn Joy PA-C (electronically signed)           Veena Alfaro PA-C  10/04/22 [id number]

## 2022-10-04 NOTE — ED NOTES
Reviewed patient discharge instructions at this time, copy given to patient. No questions or concerns. Patient voiced understanding.         Gerardo Collins RN  10/04/22 [id number]

## 2022-10-04 NOTE — DISCHARGE INSTRUCTIONS
Suture removal in 5-7 days      Your blood pressure is elevated and will need to be monitored. Have this rechecked at your doctor's office or make an magaly w/ the doctor you were referred to on your discharge instructions to have your blood pressure rechecked sometime within the next 7 days.  Untreated elevated blood pressure puts you at risk for stroke, heart attack, kidney disease and other serious medical conditions

## 2022-10-05 ENCOUNTER — TELEPHONE (OUTPATIENT)
Dept: INTERNAL MEDICINE CLINIC | Age: 82
End: 2022-10-05

## 2022-10-05 NOTE — TELEPHONE ENCOUNTER
----- Message from Rangel Barrios MD sent at 10/5/2022  1:01 PM EDT -----  Contact: [phone] (H)  I would recommend Melatonin and Tylenol  ----- Message -----  From: Kvng Ibarra MA  Sent: 10/5/2022  10:52 AM EDT  To: Rangel Barrios MD    Pt called was seen in the ED last night from a fall that she had she was treated and discharged they only gave her a tylenol when she was at the ED and did not prescribe her any pain medication. She was wanting to know if you can call in a pain medication and a sleep aid for her. I asked her if she has been taking any tylenol at home and she has not.  She also stated she does not like to take any medication if it is not prescribed but also said that she has was taking tylenol Pms in the past. Please advise        Elisha 21 [id number] - [address] [facility], [address] [place] [address] -  [phone] (Ph: [phone])

## 2022-10-14 ENCOUNTER — OFFICE VISIT (OUTPATIENT)
Dept: INTERNAL MEDICINE CLINIC | Age: 82
End: 2022-10-14

## 2022-10-14 VITALS
SYSTOLIC BLOOD PRESSURE: 128 MMHG | HEART RATE: 70 BPM | HEIGHT: 67 IN | DIASTOLIC BLOOD PRESSURE: 80 MMHG | BODY MASS INDEX: 19.3 KG/M2 | WEIGHT: 123 LBS | RESPIRATION RATE: 12 BRPM

## 2022-10-14 DIAGNOSIS — M65.30 TRIGGER FINGER, ACQUIRED: ICD-10-CM

## 2022-10-14 DIAGNOSIS — I10 BENIGN ESSENTIAL HYPERTENSION: ICD-10-CM

## 2022-10-14 DIAGNOSIS — E03.9 ACQUIRED HYPOTHYROIDISM: ICD-10-CM

## 2022-10-14 DIAGNOSIS — H34.11 CENTRAL RETINAL ARTERY OCCLUSION OF RIGHT EYE: ICD-10-CM

## 2022-10-14 DIAGNOSIS — I10 BENIGN ESSENTIAL HYPERTENSION: Primary | ICD-10-CM

## 2022-10-14 DIAGNOSIS — E78.5 HYPERLIPIDEMIA, UNSPECIFIED HYPERLIPIDEMIA TYPE: ICD-10-CM

## 2022-10-14 DIAGNOSIS — M81.0 OSTEOPOROSIS WITHOUT CURRENT PATHOLOGICAL FRACTURE, UNSPECIFIED OSTEOPOROSIS TYPE: ICD-10-CM

## 2022-10-14 DIAGNOSIS — M72.0 DUPUYTREN'S CONTRACTURE OF BOTH HANDS: ICD-10-CM

## 2022-10-14 DIAGNOSIS — Z23 NEED FOR INFLUENZA VACCINATION: ICD-10-CM

## 2022-10-14 DIAGNOSIS — E87.1 HYPONATREMIA: ICD-10-CM

## 2022-10-14 LAB
ANION GAP SERPL CALCULATED.3IONS-SCNC: 11 MMOL/L (ref 3–16)
BUN BLDV-MCNC: 9 MG/DL (ref 7–20)
CALCIUM SERPL-MCNC: 9.9 MG/DL (ref 8.3–10.6)
CHLORIDE BLD-SCNC: 94 MMOL/L (ref 99–110)
CO2: 27 MMOL/L (ref 21–32)
CREAT SERPL-MCNC: 0.9 MG/DL (ref 0.6–1.2)
GFR AFRICAN AMERICAN: >60
GFR NON-AFRICAN AMERICAN: 60
GLUCOSE BLD-MCNC: 90 MG/DL (ref 70–99)
POTASSIUM SERPL-SCNC: 5.3 MMOL/L (ref 3.5–5.1)
SODIUM BLD-SCNC: 132 MMOL/L (ref 136–145)

## 2022-10-14 PROCEDURE — 1123F ACP DISCUSS/DSCN MKR DOCD: CPT | Performed by: INTERNAL MEDICINE

## 2022-10-14 PROCEDURE — G0008 ADMIN INFLUENZA VIRUS VAC: HCPCS | Performed by: INTERNAL MEDICINE

## 2022-10-14 PROCEDURE — 99214 OFFICE O/P EST MOD 30 MIN: CPT | Performed by: INTERNAL MEDICINE

## 2022-10-14 PROCEDURE — 90662 IIV NO PRSV INCREASED AG IM: CPT | Performed by: INTERNAL MEDICINE

## 2022-10-14 RX ORDER — AMLODIPINE BESYLATE 5 MG/1
TABLET ORAL
Qty: 90 TABLET | Refills: 1 | Status: SHIPPED | OUTPATIENT
Start: 2022-10-14

## 2022-10-14 RX ORDER — LISINOPRIL 20 MG/1
20 TABLET ORAL DAILY
Qty: 90 TABLET | Refills: 1 | Status: SHIPPED | OUTPATIENT
Start: 2022-10-14 | End: 2023-01-12

## 2022-10-14 RX ORDER — LEVOTHYROXINE SODIUM 0.07 MG/1
TABLET ORAL
Qty: 90 TABLET | Refills: 1 | Status: SHIPPED | OUTPATIENT
Start: 2022-10-14

## 2022-10-14 RX ORDER — METOPROLOL TARTRATE 50 MG/1
50 TABLET, FILM COATED ORAL 2 TIMES DAILY
Qty: 180 TABLET | Refills: 1 | Status: SHIPPED | OUTPATIENT
Start: 2022-10-14

## 2022-10-14 RX ORDER — SIMVASTATIN 20 MG
TABLET ORAL
Qty: 90 TABLET | Refills: 1 | Status: SHIPPED | OUTPATIENT
Start: 2022-10-14

## 2022-10-14 ASSESSMENT — ENCOUNTER SYMPTOMS
ABDOMINAL PAIN: 0
WHEEZING: 0
RHINORRHEA: 0
NAUSEA: 0
VOMITING: 0
SHORTNESS OF BREATH: 0

## 2022-10-14 NOTE — PROGRESS NOTES
Subjective:      Patient ID: Kalia Fernandez is a 78 y.o. female. HPI  Patient is here for follow up of hypertension, hyperlipidemia, hypothyroidism, central retinal artery occlusion and osteoporosis. She fell and hurt her face. She has sutures that need removed. Patient's BP is controlled on current medications. No CP or dyspnea. She has minimal pedal edema. Her cholesterol is at goal. No myalgias. Patient's hypothyroidism is stable on replacement therapy. No diarrhea. She has mild constipation. She takes Metamucil. No heat or cold intolerance. Her vision is unchanged. 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 She has osteoporosis. She is taking Calcium. No fracture issues. Is refusing any other treatment. She has hyponatremia. She is due to see nephrology. Review of Systems   Constitutional:  Negative for appetite change and fatigue. HENT:  Negative for postnasal drip and rhinorrhea. Respiratory:  Negative for shortness of breath and wheezing. Cardiovascular:  Negative for chest pain and palpitations. Gastrointestinal:  Negative for abdominal pain, nausea and vomiting. Musculoskeletal:  Negative for joint swelling. Skin:  Negative for rash. Neurological:  Negative for light-headedness. Psychiatric/Behavioral:  Negative for sleep disturbance. Current Outpatient Medications   Medication Instructions    amLODIPine (NORVASC) 5 MG tablet TAKE 1 TABLET BY MOUTH EVERY DAY    aspirin 81 mg, DAILY    calcium carbonate (OYSTER SHELL CALCIUM 500 MG) 1250 (500 Ca) MG tablet 1 tablet, Oral, DAILY    levothyroxine (SYNTHROID) 75 MCG tablet TAKE 1 TABLET BY MOUTH EVERY DAY    lisinopril (PRINIVIL;ZESTRIL) 20 mg, Oral, DAILY    metoprolol tartrate (LOPRESSOR) 50 mg, Oral, 2 TIMES DAILY    Multiple Vitamin (MULTI-VITAMIN) TABS Take  by mouth.       NONFORMULARY Indications: Chloracedin    simvastatin (ZOCOR) 20 MG tablet TAKE 1 TABLET BY MOUTH EVERY EVENING    vitamin D 1000 units CAPS Oral         There are no changes to past medical history, family history, social history or review of systems(except as noted in the history section) since prior note (all reviewed with patient). /80 (Site: Right Upper Arm, Position: Sitting, Cuff Size: Medium Adult)   Pulse 70   Resp 12   Ht 5' 7\" (1.702 m)   Wt 123 lb (55.8 kg)   BMI 19.26 kg/m²        Objective:   Physical Exam  Constitutional:       Appearance: She is well-developed. HENT:      Head: Normocephalic. Eyes:      Conjunctiva/sclera: Conjunctivae normal.      Pupils: Pupils are equal, round, and reactive to light. Neck:      Thyroid: No thyroid mass or thyromegaly. Vascular: No carotid bruit or JVD. Trachea: Trachea normal.   Cardiovascular:      Rate and Rhythm: Normal rate and regular rhythm. Heart sounds: Normal heart sounds. No murmur heard. No gallop. Pulmonary:      Effort: Pulmonary effort is normal. No respiratory distress. Breath sounds: Normal breath sounds. No wheezing or rales. Abdominal:      General: Bowel sounds are normal. There is no distension. Palpations: Abdomen is soft. There is no hepatomegaly, splenomegaly or mass. Tenderness: There is no abdominal tenderness. Musculoskeletal:         General: Normal range of motion. Cervical back: Normal range of motion and neck supple. Lymphadenopathy:      Cervical: No cervical adenopathy. Skin:     General: Skin is warm and dry. Findings: No rash. Neurological:      Mental Status: She is alert and oriented to person, place, and time. Cranial Nerves: No cranial nerve deficit. Deep Tendon Reflexes: Reflexes are normal and symmetric. Psychiatric:         Behavior: Behavior normal.         Thought Content: Thought content normal.         Judgment: Judgment normal.       Assessment:       Diagnosis Orders   1. Benign essential hypertension  Basic Metabolic Panel      2. Acquired hypothyroidism        3. Central retinal artery occlusion of right eye        4. Hyperlipidemia, unspecified hyperlipidemia type        5. Dupuytren's contracture of both hands        6. Osteoporosis without current pathological fracture, unspecified osteoporosis type        7. Trigger finger, acquired        8. Hyponatremia        9. Need for influenza vaccination  Influenza, FLUZONE HIGH-DOSE, (age 72 y+), IM, Preservative Free, 0.7 mL             Plan:         HTN: on Metoprolol and Lisinopril. Continue Lisinopril to 20 mg daily. Hypothyroidism:  Patient's hypothyroidism is stable on replacement therapy. Hyperlipidemia:  At goal. On Zocor. 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 Osteoporosis-  On calcium with vitamin D. Refuses other treatment. Dupuytren's contracture unchanged. Sutures removed. Continue exercise. Decrease calorie intake. The current medical regimen is effective;  continue present plan and medications. See orders.

## 2023-01-28 ENCOUNTER — APPOINTMENT (OUTPATIENT)
Dept: GENERAL RADIOLOGY | Age: 83
End: 2023-01-28
Payer: COMMERCIAL

## 2023-01-28 ENCOUNTER — HOSPITAL ENCOUNTER (EMERGENCY)
Age: 83
Discharge: HOME | End: 2023-01-28
Attending: EMERGENCY MEDICINE
Payer: COMMERCIAL

## 2023-01-28 VITALS
DIASTOLIC BLOOD PRESSURE: 99 MMHG | RESPIRATION RATE: 16 BRPM | HEIGHT: 67 IN | HEART RATE: 45 BPM | OXYGEN SATURATION: 100 % | TEMPERATURE: 97.5 F | BODY MASS INDEX: 19.3 KG/M2 | WEIGHT: 123 LBS | SYSTOLIC BLOOD PRESSURE: 114 MMHG

## 2023-01-28 DIAGNOSIS — S82.832A CLOSED AVULSION FRACTURE OF DISTAL FIBULA, LEFT, INITIAL ENCOUNTER: ICD-10-CM

## 2023-01-28 DIAGNOSIS — S93.601A SPRAIN OF RIGHT FOOT, INITIAL ENCOUNTER: ICD-10-CM

## 2023-01-28 DIAGNOSIS — S93.401A MODERATE RIGHT ANKLE SPRAIN, INITIAL ENCOUNTER: Primary | ICD-10-CM

## 2023-01-28 DIAGNOSIS — S93.402A SPRAIN OF LEFT ANKLE, UNSPECIFIED LIGAMENT, INITIAL ENCOUNTER: ICD-10-CM

## 2023-01-28 PROCEDURE — 73610 X-RAY EXAM OF ANKLE: CPT

## 2023-01-28 PROCEDURE — 73630 X-RAY EXAM OF FOOT: CPT

## 2023-01-28 PROCEDURE — 99283 EMERGENCY DEPT VISIT LOW MDM: CPT

## 2023-01-28 ASSESSMENT — PAIN SCALES - GENERAL: PAINLEVEL_OUTOF10: 6

## 2023-01-28 ASSESSMENT — PAIN - FUNCTIONAL ASSESSMENT: PAIN_FUNCTIONAL_ASSESSMENT: 0-10

## 2023-01-28 NOTE — DISCHARGE INSTRUCTIONS
Acetaminophen 500 mg every 4 hours  Ibuprofen 400 mg 3 times a day  Try to give your ankles the rest  Wear the Aircast when you are up and about but do not wear them to go to bed  Follow-up with the orthopedic physician in approximately 1 week for possibly rehab and further definitive treatment

## 2023-01-28 NOTE — ED NOTES
Pt discharge instructions, follow up reviewed with pt. Pt verbalized understanding. No further needs. Pt discharged at this time.         Lupe Eubanks RN  01/28/23 3615

## 2023-01-28 NOTE — ED PROVIDER NOTES
[facility]      Pt Name: De Wiley  MRN: [id number]  [place] 1940  Date of evaluation: 1/28/2023  Provider: Kenzie Gatian MD    [address] [place] [address]       Chief Complaint   Patient presents with    Foot Pain     Pt fell a week ago and hurt both of her ankles. Bruising noted on both feet and ankles. HISTORY OF PRESENT ILLNESS   (Location/Symptom, Timing/Onset, Context/Setting, Quality, Duration, Modifying Factors, Severity)  Note limiting factors. De Wiley is a 78 y.o. female who presents to the emergency department     presents with bilateral ankle injuries    The history is provided by the patient. Nursing Notes were reviewed. REVIEW OF SYSTEMS    (2-9 systems for level 4, 10 or more for level 5)     Review of Systems   Constitutional:  Positive for activity change. Musculoskeletal:  Positive for gait problem. All other systems reviewed and are negative. Except as noted above the remainder of the review of systems was reviewed and negative. PAST MEDICAL HISTORY     Past Medical History:   Diagnosis Date    Benign essential hypertension 11/15/2017    Central retinal artery occlusion     right eye x3    Dupuytren's contracture of both hands 3/14/2014    Hyperlipidemia     Hypothyroidism     Mitral valve disease     Osteoporosis     Osteoporosis without current pathological fracture 8/9/2017    Other musculoskeletal symptoms referable to limbs(729.89)     Trigger finger (acquired)          SURGICAL HISTORY       Past Surgical History:   Procedure Laterality Date    COLONOSCOPY  1/11/2006    COLONOSCOPY N/A 7/8/2020    COLON W/ANES. (10:00) performed by Saundra Morales MD at [address] [facility] 3/23/2022    COLON W/ANES.  (10:00) performed by Saundra Morales MD at [address] [facility]  7/22/11    cataract left eye    HAND SURGERY Left 09/28/2017    Left Dupuytrens disease excision from thumb and index finger    HYSTERECTOMY (CERVIX STATUS UNKNOWN)      OTHER SURGICAL HISTORY  03/23/2022    COLON W/ANES         CURRENT MEDICATIONS       Previous Medications    AMLODIPINE (NORVASC) 5 MG TABLET    TAKE 1 TABLET BY MOUTH EVERY DAY    ASPIRIN 81 MG EC TABLET    Take 81 mg by mouth daily. CALCIUM CARBONATE (OYSTER SHELL CALCIUM 500 MG) 1250 (500 CA) MG TABLET    Take 1 tablet by mouth daily    LEVOTHYROXINE (SYNTHROID) 75 MCG TABLET    TAKE 1 TABLET BY MOUTH EVERY DAY    LISINOPRIL (PRINIVIL;ZESTRIL) 20 MG TABLET    Take 1 tablet by mouth daily    METOPROLOL TARTRATE (LOPRESSOR) 50 MG TABLET    Take 1 tablet by mouth 2 times daily    MULTIPLE VITAMIN (MULTI-VITAMIN) TABS    Take  by mouth. NONFORMULARY    Indications: Chloracedin    SIMVASTATIN (ZOCOR) 20 MG TABLET    TAKE 1 TABLET BY MOUTH EVERY EVENING    VITAMIN D 1000 UNITS CAPS    Take by mouth       ALLERGIES     Latex    FAMILY HISTORY       Family History   Problem Relation Age of Onset    Breast Cancer Sister     Cancer Sister         Thyroid/Uterine    Diabetes Sister     Thyroid Disease Sister           SOCIAL HISTORY       Social History     Socioeconomic History    Marital status:       Spouse name: None    Number of children: 6    Years of education: None    Highest education level: None   Tobacco Use    Smoking status: Never    Smokeless tobacco: Never   Vaping Use    Vaping Use: Never used   Substance and Sexual Activity    Alcohol use: No    Drug use: No    Sexual activity: Not Currently     Partners: Male       SCREENINGS    [place] Coma Scale  Eye Opening: Spontaneous  Best Verbal Response: Oriented  Best Motor Response: Obeys commands  [place] Coma Scale Score: 15          PHYSICAL EXAM    (up to 7 for level 4, 8 or more for level 5)     ED Triage Vitals [01/28/23 1547]   BP Temp Temp Source Heart Rate Resp SpO2 Height Weight   (!) 114/99 97.5 °F (36.4 °C) Oral (!) 45 16 100 % 5' 7\" (1.702 m) 123 lb (55.8 kg)       Physical Exam  Vitals and nursing note reviewed. Constitutional:       Appearance: Normal appearance. Musculoskeletal:         General: Tenderness present. Neurological:      Mental Status: She is alert. DIAGNOSTIC RESULTS     EKG: All EKG's are interpreted by the Emergency Department Physician who either signs or Co-signs this chart in the absence of a cardiologist.        RADIOLOGY:   Non-plain film images such as CT, Ultrasound and MRI are read by the radiologist. Plain radiographic images are visualized and preliminarily interpreted by the emergency physician with the below findings:        Interpretation per the Radiologist below, if available at the time of this note:    XR FOOT LEFT (MIN 3 VIEWS)   Final Result   Small bony fragments inferior to the lateral malleolus. Recommend   correlating with a left ankle series otherwise, unremarkable left foot. XR FOOT RIGHT (MIN 3 VIEWS)   Final Result   Mild soft tissue swelling along the dorsum of the foot extending distally   with no acute bony abnormality. XR ANKLE LEFT (MIN 3 VIEWS)   Final Result   Mildly displaced and slightly comminuted avulsion fractures along the   inferior aspect of the lateral malleolus with moderate soft tissue swelling   laterally. XR ANKLE RIGHT (MIN 3 VIEWS)   Final Result   Mild soft tissue swelling around the ankle with no acute bony abnormality. LABS:  No results found for this visit on 01/28/23.          EMERGENCY DEPARTMENT COURSE and DIFFERENTIAL DIAGNOSIS/MDM:     Vitals:    01/28/23 1547   BP: (!) 114/99   Pulse: (!) 45   Resp: 16   Temp: 97.5 °F (36.4 °C)   TempSrc: Oral   SpO2: 100%   Weight: 123 lb (55.8 kg)   Height: 5' 7\" (1.702 m)           MDM  Historian patient plus daughter  Limitations none  Medical appropriate history patient presents history of probably walking and accidentally having mechanical trip where she sprained both of her ankles this occurred 3 to 4 days ago she now presents with ecchymosis swelling she has no other injuries no loss of conscious no chest pain no head injury no focal numbness tingling  Medically appropriate physical exam reveals ecchymosis swelling and tenderness on the lateral talofibular ligaments as well as the lateral ligaments diffusely as well as into the foot area. No gross deformity no neurovascular deficits she is able to ambulate      Conditions and comorbidities includes no blood thinners      Patient did undergo x-rays which were ordered and reviewed independent review and concurrence of the readings revealed a small chip fracture of the distal fibula on the left side otherwise no other obvious fractures were noted. Consultants patient is being referred to our orthopedic team  Visit summary patient presented a mechanical fall several days ago she presents with severe pain swelling and discomfort she is able to ambulate however. Treatments given will be bilateral Aircast treatment we did talk about prescriptions we felt that none were needed that she could use Tylenol and a little bit of some ibuprofen    REASSESSMENT          CRITICAL CARE TIME     CONSULTS:  None      PROCEDURES:     Procedures    MEDICATIONS GIVEN THIS VISIT:  Medications - No data to display     FINAL IMPRESSION      1. Moderate right ankle sprain, initial encounter    2. Sprain of right foot, initial encounter    3. Sprain of left ankle, unspecified ligament, initial encounter    4. Closed avulsion fracture of distal fibula, left, initial encounter            DISPOSITION/PLAN   DISPOSITION Decision To Discharge 01/28/2023 06:09:43 PM      PATIENT REFERRED TO:  Arash Marcos MD  [address]  [phone]    Schedule an appointment as soon as possible for a visit in 1 week      DISCHARGE MEDICATIONS:  New Prescriptions    No medications on file       Controlled Substances Monitoring  No flowsheet data found.         (Please note that portions of this note were completed with a voice recognition program.  Efforts were made to edit the dictations but occasionally words are mis-transcribed.)    Patient was advised to return to the Emergency Department if there was any worsening.     Nupur Alonso MD (electronically signed)  Attending Emergency Physician         Kamaljit Pruett MD  01/28/23 2141

## 2023-01-30 ENCOUNTER — TELEPHONE (OUTPATIENT)
Dept: ORTHOPEDIC SURGERY | Age: 83
End: 2023-01-30

## 2023-01-30 NOTE — TELEPHONE ENCOUNTER
Spoke with the patient. She states her daughter has to bring her to appointments and also has to take kids to school in morning and is limited with when they can make it. Explained that providers schedule is already very overbooked tomorrow and that I could offer an appointment next week. She states she will call back if she needs to cancel after talking to her daughter.

## 2023-02-07 ENCOUNTER — OFFICE VISIT (OUTPATIENT)
Dept: ORTHOPEDIC SURGERY | Age: 83
End: 2023-02-07

## 2023-02-07 VITALS — HEIGHT: 67 IN | BODY MASS INDEX: 19.46 KG/M2 | WEIGHT: 124 LBS

## 2023-02-07 DIAGNOSIS — S82.62XA CLOSED AVULSION FRACTURE OF LATERAL MALLEOLUS OF LEFT FIBULA, INITIAL ENCOUNTER: Primary | ICD-10-CM

## 2023-02-07 DIAGNOSIS — S93.491A SPRAIN OF ANTERIOR TALOFIBULAR LIGAMENT OF RIGHT ANKLE, INITIAL ENCOUNTER: ICD-10-CM

## 2023-02-07 NOTE — PROGRESS NOTES
CHIEF COMPLAINT:   1- Left ankle pain / lateral malleolus avulsion fracture. 2- Right ankle pain / ATF sprain. DATE OF INJURY: 1/19/2023    HISTORY:  Ms. Rosalio Escalona 78 y.o.  female presents today for the first visit for evaluation of a left > right ankle injury which occurred when she was walking and tripped. She was first seen and evaluated in [place], where she was x-rayed, splinted and asked to f/u with Orthopedics. She is complaining of bilateral L>R lateral ankle pain and swelling 5/10. This is better with elevation and worse with bearing any wt. The pain is sharp and not radiating. No numbness or tingling sensation. Alleviating factors: rest. No other complaint. Past Medical History:   Diagnosis Date    Benign essential hypertension 11/15/2017    Central retinal artery occlusion     right eye x3    Dupuytren's contracture of both hands 3/14/2014    Hyperlipidemia     Hypothyroidism     Mitral valve disease     Osteoporosis     Osteoporosis without current pathological fracture 8/9/2017    Other musculoskeletal symptoms referable to limbs(729.89)     Trigger finger (acquired)        Past Surgical History:   Procedure Laterality Date    COLONOSCOPY  1/11/2006    COLONOSCOPY N/A 7/8/2020    COLON W/ANES. (10:00) performed by Génesis Perea MD at [address] [facility]  3/23/2022    COLON W/ANES. (10:00) performed by Génesis Perea MD at [address] [facility]  7/22/11    cataract left eye    HAND SURGERY Left 09/28/2017    Left Dupuytrens disease excision from thumb and index finger    HYSTERECTOMY (CERVIX STATUS UNKNOWN)      OTHER SURGICAL HISTORY  03/23/2022    COLON W/ANES       Social History     Socioeconomic History    Marital status:       Spouse name: Not on file    Number of children: 6    Years of education: Not on file    Highest education level: Not on file   Occupational History    Not on file   Tobacco Use    Smoking status: Never Smokeless tobacco: Never   Vaping Use    Vaping Use: Never used   Substance and Sexual Activity    Alcohol use: No    Drug use: No    Sexual activity: Not Currently     Partners: Male   Other Topics Concern    Not on file   Social History Narrative    Not on file     Social Determinants of Health     Financial Resource Strain: Not on file   Food Insecurity: Not on file   Transportation Needs: Not on file   Physical Activity: Not on file   Stress: Not on file   Social Connections: Not on file   Intimate Partner Violence: Not on file   Housing Stability: Not on file       Family History   Problem Relation Age of Onset    Breast Cancer Sister     Cancer Sister         Thyroid/Uterine    Diabetes Sister     Thyroid Disease Sister        Current Outpatient Medications on File Prior to Visit   Medication Sig Dispense Refill    amLODIPine (NORVASC) 5 MG tablet TAKE 1 TABLET BY MOUTH EVERY DAY 90 tablet 1    levothyroxine (SYNTHROID) 75 MCG tablet TAKE 1 TABLET BY MOUTH EVERY DAY 90 tablet 1    lisinopril (PRINIVIL;ZESTRIL) 20 MG tablet Take 1 tablet by mouth daily 90 tablet 1    metoprolol tartrate (LOPRESSOR) 50 MG tablet Take 1 tablet by mouth 2 times daily 180 tablet 1    simvastatin (ZOCOR) 20 MG tablet TAKE 1 TABLET BY MOUTH EVERY EVENING 90 tablet 1    calcium carbonate (OYSTER SHELL CALCIUM 500 MG) 1250 (500 Ca) MG tablet Take 1 tablet by mouth daily      vitamin D 1000 units CAPS Take by mouth      NONFORMULARY Indications: Chloracedin      Multiple Vitamin (MULTI-VITAMIN) TABS Take  by mouth. aspirin 81 MG EC tablet Take 81 mg by mouth daily. No current facility-administered medications on file prior to visit. Pertinent items are noted in HPI  Review of systems reviewed from Patient History Form and available in the patient's chart under the Media tab. PHYSICAL EXAMINATION:  Ms. Abril Ortega is a very pleasant 78 y.o.   female who presents today in no acute distress, awake, alert, and oriented. She is well dressed, nourished and  groomed. Patient with normal affect. Height is  5' 7\" (1.702 m), weight is 124 lb (56.2 kg), Body mass index is 19.42 kg/m². Resting respiratory rate is 16. Examination of the gait, showed that the patient walks with an AirCast bilateral WB. Examination of both ankles showing a decreased range of motion of the left ankle compare to the other side. There is moderate swelling that can be seen, as well as ecchymosis bilateral ankle L>R. She has intact sensation and good pedal pulses. She has significant tenderness on deep palpation over the lateral malleolus of the left ankle and ATF right ankle. IMAGING: Xrays, 3 views of the left ankle dated 1/28/2023 from [facility],  were reviewed, and showed a minimally displaced lateral malleolus avulsion fracture. X-rays were taken in the ED 1/28/2023, 3 views of the right ankle, and showed no fracture. No other abnormality. IMPRESSION:   1- Left ankle pain / lateral malleolus avulsion fracture. 2- Right ankle pain / ATF sprain. PLAN:  I assured the patient that the right ankle xray is negative for acute fracture. I discussed that the overall alignment of the left ankle fracture is good and that we can try to treat this non-operatively in a boot WBAT. We discussed the risk of nonunion and or malunion. We applied a boot today in the office and instructed her  in care. We will see her  back in 6 weeks at which time we will get a new xray of the left ankle. Procedures    Phraxisg Tall Izzy Walking Boot     Patient was prescribed a Phraxisg Tall Izzy Walking Boot. The left ankle will require stabilization / immobilization from this semi-rigid / rigid orthosis to improve their function. The orthosis will assist in protecting the affected area, provide functional support and facilitate healing. Patient was instructed to progress ambulation weight bearing as tolerated in the device.      The patient was educated and fit by a healthcare professional with expert knowledge and specialization in brace application while under the direct supervision of the physician. Verbal and written instructions for the use of and application of this item were provided. They were instructed to contact the office immediately should the brace result in increased pain, decreased sensation, increased swelling or worsening of the condition.      Amina Garvey MD

## 2023-02-13 ENCOUNTER — OFFICE VISIT (OUTPATIENT)
Dept: INTERNAL MEDICINE CLINIC | Age: 83
End: 2023-02-13

## 2023-02-13 VITALS
HEART RATE: 70 BPM | BODY MASS INDEX: 19.46 KG/M2 | SYSTOLIC BLOOD PRESSURE: 138 MMHG | WEIGHT: 124 LBS | HEIGHT: 67 IN | RESPIRATION RATE: 12 BRPM | DIASTOLIC BLOOD PRESSURE: 80 MMHG

## 2023-02-13 DIAGNOSIS — H34.11 CENTRAL RETINAL ARTERY OCCLUSION OF RIGHT EYE: ICD-10-CM

## 2023-02-13 DIAGNOSIS — E03.9 ACQUIRED HYPOTHYROIDISM: ICD-10-CM

## 2023-02-13 DIAGNOSIS — M81.0 OSTEOPOROSIS WITHOUT CURRENT PATHOLOGICAL FRACTURE, UNSPECIFIED OSTEOPOROSIS TYPE: ICD-10-CM

## 2023-02-13 DIAGNOSIS — M72.0 DUPUYTREN'S CONTRACTURE OF BOTH HANDS: ICD-10-CM

## 2023-02-13 DIAGNOSIS — I10 BENIGN ESSENTIAL HYPERTENSION: ICD-10-CM

## 2023-02-13 DIAGNOSIS — E78.5 HYPERLIPIDEMIA, UNSPECIFIED HYPERLIPIDEMIA TYPE: ICD-10-CM

## 2023-02-13 DIAGNOSIS — Z00.00 MEDICARE ANNUAL WELLNESS VISIT, SUBSEQUENT: Primary | ICD-10-CM

## 2023-02-13 DIAGNOSIS — S82.62XD CLOSED AVULSION FRACTURE OF LATERAL MALLEOLUS OF LEFT FIBULA WITH ROUTINE HEALING, SUBSEQUENT ENCOUNTER: ICD-10-CM

## 2023-02-13 PROCEDURE — 1123F ACP DISCUSS/DSCN MKR DOCD: CPT | Performed by: INTERNAL MEDICINE

## 2023-02-13 PROCEDURE — G0439 PPPS, SUBSEQ VISIT: HCPCS | Performed by: INTERNAL MEDICINE

## 2023-02-13 PROCEDURE — 3079F DIAST BP 80-89 MM HG: CPT | Performed by: INTERNAL MEDICINE

## 2023-02-13 PROCEDURE — 3075F SYST BP GE 130 - 139MM HG: CPT | Performed by: INTERNAL MEDICINE

## 2023-02-13 ASSESSMENT — LIFESTYLE VARIABLES
HOW MANY STANDARD DRINKS CONTAINING ALCOHOL DO YOU HAVE ON A TYPICAL DAY: PATIENT DOES NOT DRINK
HOW OFTEN DO YOU HAVE A DRINK CONTAINING ALCOHOL: NEVER

## 2023-02-13 ASSESSMENT — PATIENT HEALTH QUESTIONNAIRE - PHQ9
SUM OF ALL RESPONSES TO PHQ9 QUESTIONS 1 & 2: 0
1. LITTLE INTEREST OR PLEASURE IN DOING THINGS: 0
2. FEELING DOWN, DEPRESSED OR HOPELESS: 0
SUM OF ALL RESPONSES TO PHQ QUESTIONS 1-9: 0

## 2023-02-13 NOTE — PROGRESS NOTES
Medicare Annual Wellness Visit    Emelyn Ivan is here for Medicare AWV    Assessment & Plan   Medicare annual wellness visit, subsequent  Closed avulsion fracture of lateral malleolus of left fibula with routine healing, subsequent encounter  Central retinal artery occlusion of right eye  Acquired hypothyroidism  -     Comprehensive Metabolic Panel; Future  -     CBC with Auto Differential; Future  -     Uric Acid; Future  -     TSH with Reflex to FT4; Future  Hyperlipidemia, unspecified hyperlipidemia type  Dupuytren's contracture of both hands  Benign essential hypertension  Osteoporosis without current pathological fracture, unspecified osteoporosis type        -Medicare physical done  - Left lateral malleolus fracture. In a walking boot. She will get a compression sleeve. - Patient's hypothyroidism is stable on replacement therapy. - on ASA for history of CRAO  - HLD. Stable  - Her BP is controlled  - refuses treatment for osteoporosis. - check labs for hyponatremia. Recommendations for Preventive Services Due: see orders and patient instructions/AVS.  Recommended screening schedule for the next 5-10 years is provided to the patient in written form: see Patient Instructions/AVS.     No follow-ups on file. Subjective     Patient is here for follow up of hypertension, hyperlipidemia, hypothyroidism, central retinal artery occlusion and osteoporosis. She fell and has a left lateral malleolus fracture. She has seen ortho. Patient's BP is controlled on current medications. No CP or dyspnea. She has minimal pedal edema. Her cholesterol is at goal. No myalgias. Patient's hypothyroidism is stable on replacement therapy. No diarrhea. She has mild constipation. She takes Metamucil. No heat or cold intolerance. Her vision is unchanged. 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 She has osteoporosis. She is taking Calcium. Is refusing any other treatment. She has hyponatremia.  She has seen nephrology. Patient's complete Health Risk Assessment and screening values have been reviewed and are found in Flowsheets. The following problems were reviewed today and where indicated follow up appointments were made and/or referrals ordered. Positive Risk Factor Screenings with Interventions:    Fall Risk:  Do you feel unsteady or are you worried about falling? : no  2 or more falls in past year?: (!) yes  Fall with injury in past year?: (!) yes     Interventions:    Safety tips provided    Cognitive: Words recalled: 3 Words Recalled   Clock Drawing Test (CDT): (!) Abnormal   Total Score: 3   Total Score Interpretation: Normal Mini-Cog      Interventions:  Patient feels her memory is ok                Vision Screen:  Do you have difficulty driving, watching TV, or doing any of your daily activities because of your eyesight?: (!) Yes  Have you had an eye exam within the past year?: Yes  No results found. Interventions:   Patient encouraged to make appointment with their eye specialist                      Objective   Vitals:    02/13/23 1012   BP: 138/80   Site: Right Upper Arm   Position: Sitting   Cuff Size: Medium Adult   Pulse: 70   Resp: 12   Weight: 124 lb (56.2 kg)   Height: 5' 7\" (1.702 m)      Body mass index is 19.42 kg/m².       General Appearance: alert and oriented to person, place and time, well developed and well- nourished, in no acute distress  Skin: warm and dry, no rash or erythema  Head: normocephalic and atraumatic  Eyes: pupils equal, round, and reactive to light, extraocular eye movements intact, conjunctivae normal  ENT: tympanic membrane, external ear and ear canal normal bilaterally, nose without deformity, nasal mucosa and turbinates normal without polyps  Neck: supple and non-tender without mass, no thyromegaly or thyroid nodules, no cervical lymphadenopathy  Pulmonary/Chest: clear to auscultation bilaterally- no wheezes, rales or rhonchi, normal air movement, no respiratory distress  Cardiovascular: normal rate, regular rhythm, normal S1 and S2, no murmurs, rubs, clicks, or gallops, distal pulses intact, no carotid bruits  Abdomen: soft, non-tender, non-distended, normal bowel sounds, no masses or organomegaly  Extremities: no cyanosis, clubbing or edema  Musculoskeletal: normal range of motion, no joint swelling, deformity or tenderness  Neurologic: reflexes normal and symmetric, no cranial nerve deficit, gait, coordination and speech normal       Allergies   Allergen Reactions    Latex      Skin irritation when wears gloves often. Prior to Visit Medications    Medication Sig Taking? Authorizing Provider   amLODIPine (NORVASC) 5 MG tablet TAKE 1 TABLET BY MOUTH EVERY DAY Yes Severiano Sullivan MD   levothyroxine (SYNTHROID) 75 MCG tablet TAKE 1 TABLET BY MOUTH EVERY DAY Yes Severiano Sullivan MD   lisinopril (PRINIVIL;ZESTRIL) 20 MG tablet Take 1 tablet by mouth daily Yes Severiano Sullivan MD   metoprolol tartrate (LOPRESSOR) 50 MG tablet Take 1 tablet by mouth 2 times daily Yes Severiano Sullivan MD   simvastatin (ZOCOR) 20 MG tablet TAKE 1 TABLET BY MOUTH EVERY EVENING Yes Severiano Sullivan MD   calcium carbonate (OYSTER SHELL CALCIUM 500 MG) 1250 (500 Ca) MG tablet Take 1 tablet by mouth daily Yes Historical Provider, MD   vitamin D 1000 units CAPS Take by mouth Yes Historical Provider, MD   NONFORMULARY Indications: Chloracedin Yes Historical Provider, MD   Multiple Vitamin (MULTI-VITAMIN) TABS Take  by mouth. Yes Historical Provider, MD   aspirin 81 MG EC tablet Take 81 mg by mouth daily.  Yes Historical Provider, MD [place] (Including outside providers/suppliers regularly involved in providing care):   Patient Care Team:  Severiano Sullivan MD as PCP - Franki Abdi MD as PCP - Empaneled Provider  Rhonda Bingham MD as Surgeon (Orthopedic Surgery)     Reviewed and updated this visit:  Tobacco  Allergies Meds  Problems  Med Hx  Surg Hx  Soc Hx  Fam Hx               Makenzie Castelan MD

## 2023-02-13 NOTE — PATIENT INSTRUCTIONS
Preventing Falls: Care Instructions  Overview     Getting around your home safely can be a challenge if you have injuries or health problems that make it easy for you to fall. Loose rugs and furniture in walkways are among the dangers for many older people who have problems walking or who have poor eyesight. People who have conditions such as arthritis, osteoporosis, or dementia also have to be careful not to fall. You can make your home safer with a few simple measures. Follow-up care is a key part of your treatment and safety. Be sure to make and go to all appointments, and call your doctor if you are having problems. It's also a good idea to know your test results and keep a list of the medicines you take. How can you care for yourself at home? Taking care of yourself  Exercise regularly to improve your strength, muscle tone, and balance. Walk if you can. Swimming may be a good choice if you cannot walk easily. Have your vision and hearing checked each year or any time you notice a change. If you have trouble seeing and hearing, you might not be able to avoid objects and could lose your balance. Know the side effects of the medicines you take. Ask your doctor or pharmacist whether the medicines you take can affect your balance. Sleeping pills or sedatives can affect your balance. Limit the amount of alcohol you drink. Alcohol can impair your balance and other senses. Ask your doctor whether calluses or corns on your feet need to be removed. If you wear loose-fitting shoes because of calluses or corns, you can lose your balance and fall. Talk to your doctor if you have numbness in your feet. You may get dizzy if you do not drink enough water. To prevent dehydration, drink plenty of fluids. Choose water and other clear liquids. If you have kidney, heart, or liver disease and have to limit fluids, talk with your doctor before you increase the amount of fluids you drink.   Preventing falls at home  Remove raised doorway thresholds, throw rugs, and clutter. Repair loose carpet or raised areas in the floor. Move furniture and electrical cords to keep them out of walking paths. Use nonskid floor wax, and wipe up spills right away, especially on ceramic tile floors. If you use a walker or cane, put rubber tips on it. If you use crutches, clean the bottoms of them regularly with an abrasive pad, such as steel wool. Keep your house well lit, especially stairways, porches, and outside walkways. Use night-lights in areas such as hallways and bathrooms. Add extra light switches or use remote switches (such as switches that go on or off when you clap your hands) to make it easier to turn lights on if you have to get up during the night. Install sturdy handrails on stairways. Move items in your cabinets so that the things you use a lot are on the lower shelves (about waist level). Keep a cordless phone and a flashlight with new batteries by your bed. If possible, put a phone in each of the main rooms of your house, or carry a cell phone in case you fall and cannot reach a phone. Or, you can wear a device around your neck or wrist. You push a button that sends a signal for help. Wear low-heeled shoes that fit well and give your feet good support. Use footwear with nonskid soles. Check the heels and soles of your shoes for wear. Repair or replace worn heels or soles. Do not wear socks without shoes on smooth floors, such as wood. Walk on the grass when the sidewalks are slippery. If you live in an area that gets snow and ice in the winter, sprinkle salt on slippery steps and sidewalks. Or ask a family member or friend to do this for you. Preventing falls in the bath  Install grab bars and nonskid mats inside and outside your shower or tub and near the toilet and sinks. Use shower chairs and bath benches. Use a hand-held shower head that will allow you to sit while showering.   Get into a tub or shower by putting the weaker leg in first. Get out of a tub or shower with your strong side first.  Repair loose toilet seats and consider installing a raised toilet seat to make getting on and off the toilet easier. Keep your bathroom door unlocked while you are in the shower. Where can you learn more? Go to http://www.johnson.com/ and enter G117 to learn more about \"Preventing Falls: Care Instructions. \"  Current as of: May 4, 2022               Content Version: 13.5  © 8015-9776 Healthwise, Incorporated. Care instructions adapted under license by [facility] ([facility]). If you have questions about a medical condition or this instruction, always ask your healthcare professional. [address] any warranty or liability for your use of this information. Hearing Loss: Care Instructions  Overview     Hearing loss is a sudden or slow decrease in how well you hear. It can range from mild to severe. Permanent hearing loss can occur with aging. It also can happen when you are exposed long-term to loud noise. Examples include listening to loud music, riding motorcycles, or being around other loud machines. Hearing loss can affect your work and home life. It can make you feel lonely or depressed. You may feel that you have lost your independence. But hearing aids and other devices can help you hear better and feel connected to others. Follow-up care is a key part of your treatment and safety. Be sure to make and go to all appointments, and call your doctor if you are having problems. It's also a good idea to know your test results and keep a list of the medicines you take. How can you care for yourself at home? Avoid loud noises whenever possible. This helps keep your hearing from getting worse. Always wear hearing protection around loud noises. Wear a hearing aid as directed. See a professional who can help you pick a hearing aid that fits you. Have hearing tests as your doctor suggests. They can show whether your hearing has changed. Your hearing aid may need to be adjusted. Use other devices as needed. These may include:  Telephone amplifiers and hearing aids that can connect to a television, stereo, radio, or microphone. Devices that use lights or vibrations. These alert you to the doorbell, a ringing telephone, or a baby monitor. Television closed-captioning. This shows the words at the bottom of the screen. Most new TVs can do this. TTY (text telephone). This lets you type messages back and forth on the telephone instead of talking or listening. These devices are also called TDD. When messages are typed on the keyboard, they are sent over the phone line to a receiving TTY. The message is shown on a monitor. Use text messaging, social media, and email if it is hard for you to communicate by telephone. Try to learn a listening technique called speechreading. It is not lipreading. You pay attention to people's gestures, expressions, posture, and tone of voice. These clues can help you understand what a person is saying. Face the person you are talking to, and have them face you. Make sure the lighting is good. You need to see the other person's face clearly. Think about counseling if you need help to adjust to your hearing loss. When should you call for help? Watch closely for changes in your health, and be sure to contact your doctor if:    You think your hearing is getting worse.     You have new symptoms, such as dizziness or nausea. Where can you learn more? Go to http://www.BeehiveID.com/ and enter R798 to learn more about \"Hearing Loss: Care Instructions. \"  Current as of: May 4, 2022               Content Version: 13.5  © 2738-7091 Healthwise, Incorporated. Care instructions adapted under license by [facility] ([facility]).  If you have questions about a medical condition or this instruction, always ask your healthcare professional. Thais Mota any warranty or liability for your use of this information. Learning About Vision Tests  What are vision tests? The four most common vision tests are visual acuity tests, refraction, visual field tests, and color vision tests. Visual acuity (sharpness) tests  These tests are used: To see if you need glasses or contact lenses. To monitor an eye problem. To check an eye injury. Visual acuity tests are done as part of routine exams. You may also have this test when you get your 's license or apply for some types of jobs. Visual field tests  These tests are used: To check for vision loss in any area of your range of vision. To screen for certain eye diseases. To look for nerve damage after a stroke, head injury, or other problem that could reduce blood flow to the brain. Refraction and color tests  A refraction test is done to find the right prescription for glasses and contact lenses. A color vision test is done to check for color blindness. Color vision is often tested as part of a routine exam. You may also have this test when you apply for a job where recognizing different colors is important, such as , electronics, or the [employer]. How are vision tests done? Visual acuity test   You cover one eye at a time. You read aloud from a wall chart across the room. You read aloud from a small card that you hold in your hand. Refraction   You look into a special device. The device puts lenses of different strengths in front of each eye to see how strong your glasses or contact lenses need to be. Visual field tests   Your doctor may have you look through special machines. Or your doctor may simply have you stare straight ahead while they move a finger into and out of your field of vision. Color vision test   You look at pieces of printed test patterns in various colors. You say what number or symbol you see.   Your doctor may have you trace the number or symbol using a pointer. How do these tests feel? There is very little chance of having a problem from this test. If dilating drops are used for a vision test, they may make the eyes sting and cause a medicine taste in the mouth. Follow-up care is a key part of your treatment and safety. Be sure to make and go to all appointments, and call your doctor if you are having problems. It's also a good idea to know your test results and keep a list of the medicines you take. Where can you learn more? Go to http://www.johnson.com/ and enter G551 to learn more about \"Learning About Vision Tests. \"  Current as of: October 12, 2022               Content Version: 13.5  © 2006-2022 [employer]. Care instructions adapted under license by [facility] ([facility]). If you have questions about a medical condition or this instruction, always ask your healthcare professional. [place] [address] any warranty or liability for your use of this information. Advance Directives: Care Instructions  Overview  An advance directive is a legal way to state your wishes at the end of your life. It tells your family and your doctor what to do if you can't say what you want. There are two main types of advance directives. You can change them any time your wishes change. Living will. This form tells your family and your doctor your wishes about life support and other treatment. The form is also called a declaration. Medical power of . This form lets you name a person to make treatment decisions for you when you can't speak for yourself. This person is called a health care agent (health care proxy, health care surrogate). The form is also called a durable power of  for health care. If you do not have an advance directive, decisions about your medical care may be made by a family member, or by a doctor or a  who doesn't know you.   It may help to think of an advance directive as a gift to the people who care for you. If you have one, they won't have to make tough decisions by themselves. For more information, including forms for your state, see the [address] website (www.caringinfo.org/planning/advance-directives/). Follow-up care is a key part of your treatment and safety. Be sure to make and go to all appointments, and call your doctor if you are having problems. It's also a good idea to know your test results and keep a list of the medicines you take. What should you include in an advance directive? Many states have a unique advance directive form. (It may ask you to address specific issues.) Or you might use a universal form that's approved by many states. If your form doesn't tell you what to address, it may be hard to know what to include in your advance directive. Use the questions below to help you get started. Who do you want to make decisions about your medical care if you are not able to? What life-support measures do you want if you have a serious illness that gets worse over time or can't be cured? What are you most afraid of that might happen? (Maybe you're afraid of having pain, losing your independence, or being kept alive by machines.)  Where would you prefer to die? (Your home? A hospital? A nursing home?)  Do you want to donate your organs when you die? Do you want certain Ep[employer] practices performed before you die? When should you call for help? Be sure to contact your doctor if you have any questions. Where can you learn more? Go to http://www.johnson.com/ and enter R264 to learn more about \"Advance Directives: Care Instructions. \"  Current as of: June 16, 2022               Content Version: 13.5  © 6395-7466 Healthwise, Incorporated. Care instructions adapted under license by [facility] [employer] [facility] ([facility]).  If you have questions about a medical condition or this instruction, always ask your healthcare professional. Desmond Dee disclaims any warranty or liability for your use of this information. A Healthy Heart: Care Instructions  Your Care Instructions     Coronary artery disease, also called heart disease, occurs when a substance called plaque builds up in the vessels that supply oxygen-rich blood to your heart muscle. This can narrow the blood vessels and reduce blood flow. A heart attack happens when blood flow is completely blocked. A high-fat diet, smoking, and other factors increase the risk of heart disease. Your doctor has found that you have a chance of having heart disease. You can do lots of things to keep your heart healthy. It may not be easy, but you can change your diet, exercise more, and quit smoking. These steps really work to lower your chance of heart disease. Follow-up care is a key part of your treatment and safety. Be sure to make and go to all appointments, and call your doctor if you are having problems. It's also a good idea to know your test results and keep a list of the medicines you take. How can you care for yourself at home? Diet    Use less salt when you cook and eat. This helps lower your blood pressure. Taste food before salting. Add only a little salt when you think you need it. With time, your taste buds will adjust to less salt.     Eat fewer snack items, fast foods, canned soups, and other high-salt, high-fat, processed foods.     Read food labels and try to avoid saturated and trans fats. They increase your risk of heart disease by raising cholesterol levels.     Limit the amount of solid fat-butter, margarine, and shortening-you eat. Use olive, peanut, or canola oil when you cook. Bake, broil, and steam foods instead of frying them.     Eat a variety of fruit and vegetables every day. Dark green, deep orange, red, or yellow fruits and vegetables are especially good for you.  Examples include spinach, carrots, peaches, and berries.     Foods high in fiber can reduce your cholesterol and provide important vitamins and minerals. High-fiber foods include whole-grain cereals and breads, oatmeal, beans, brown rice, citrus fruits, and apples.     Eat lean proteins. Heart-healthy proteins include seafood, lean meats and poultry, eggs, beans, peas, nuts, seeds, and soy products.     Limit drinks and foods with added sugar. These include candy, desserts, and soda pop. Lifestyle changes    If your doctor recommends it, get more exercise. Walking is a good choice. Bit by bit, increase the amount you walk every day. Try for at least 30 minutes on most days of the week. You also may want to swim, bike, or do other activities.     Do not smoke. If you need help quitting, talk to your doctor about stop-smoking programs and medicines. These can increase your chances of quitting for good. Quitting smoking may be the most important step you can take to protect your heart. It is never too late to quit.     Limit alcohol to 2 drinks a day for men and 1 drink a day for women. Too much alcohol can cause health problems.     Manage other health problems such as diabetes, high blood pressure, and high cholesterol. If you think you may have a problem with alcohol or drug use, talk to your doctor. Medicines    Take your medicines exactly as prescribed. Call your doctor if you think you are having a problem with your medicine.     If your doctor recommends aspirin, take the amount directed each day. Make sure you take aspirin and not another kind of pain reliever, such as acetaminophen (Tylenol). When should you call for help? Call 911 if you have symptoms of a heart attack. These may include:    Chest pain or pressure, or a strange feeling in the chest.     Sweating.     Shortness of breath.     Pain, pressure, or a strange feeling in the back, neck, jaw, or upper belly or in one or both shoulders or arms.     Lightheadedness or sudden weakness.     A fast or irregular heartbeat.    After you call 911, the  may tell you to chew 1 adult-strength or 2 to 4 low-dose aspirin. Wait for an ambulance. Do not try to drive yourself. Watch closely for changes in your health, and be sure to contact your doctor if you have any problems. Where can you learn more? Go to http://www.johnson.com/ and enter F075 to learn more about \"A Healthy Heart: Care Instructions. \"  Current as of: September 7, 2022               Content Version: 13.5  © 2653-0992 Healthwise, [employer]. Care instructions adapted under license by [facility] ([facility]). If you have questions about a medical condition or this instruction, always ask your healthcare professional. [place] [address] any warranty or liability for your use of this information. Personalized Preventive Plan for Mary Jo Mccormick - 2/13/2023  Medicare offers a range of preventive health benefits. Some of the tests and screenings are paid in full while other may be subject to a deductible, co-insurance, and/or copay. Some of these benefits include a comprehensive review of your medical history including lifestyle, illnesses that may run in your family, and various assessments and screenings as appropriate. After reviewing your medical record and screening and assessments performed today your provider may have ordered immunizations, labs, imaging, and/or referrals for you. A list of these orders (if applicable) as well as your Preventive Care list are included within your After Visit Summary for your review. Other Preventive Recommendations:    A preventive eye exam performed by an eye specialist is recommended every 1-2 years to screen for glaucoma; cataracts, macular degeneration, and other eye disorders. A preventive dental visit is recommended every 6 months. Try to get at least 150 minutes of exercise per week or 10,000 steps per day on a pedometer .   Order or download the FREE \"Exercise & Physical Activity: Your Everyday Guide\" from The [employer]. Call 3-990.592.3028 or search The [employer] online. You need 9364-5169 mg of calcium and 2833-8791 IU of vitamin D per day. It is possible to meet your calcium requirement with diet alone, but a vitamin D supplement is usually necessary to meet this goal.  When exposed to the sun, use a sunscreen that protects against both UVA and UVB radiation with an SPF of 30 or greater. Reapply every 2 to 3 hours or after sweating, drying off with a towel, or swimming. Always wear a seat belt when traveling in a car. Always wear a helmet when riding a bicycle or motorcycle.

## 2023-03-07 ENCOUNTER — TELEPHONE (OUTPATIENT)
Dept: INTERNAL MEDICINE CLINIC | Age: 83
End: 2023-03-07

## 2023-03-07 DIAGNOSIS — J06.9 UPPER RESPIRATORY TRACT INFECTION, UNSPECIFIED TYPE: Primary | ICD-10-CM

## 2023-03-07 RX ORDER — AZITHROMYCIN 250 MG/1
TABLET, FILM COATED ORAL
Qty: 1 PACKET | Refills: 0 | Status: SHIPPED | OUTPATIENT
Start: 2023-03-07

## 2023-03-07 RX ORDER — BENZONATATE 200 MG/1
200 CAPSULE ORAL 3 TIMES DAILY PRN
Qty: 30 CAPSULE | Refills: 0 | Status: SHIPPED | OUTPATIENT
Start: 2023-03-07 | End: 2023-03-17

## 2023-03-07 NOTE — TELEPHONE ENCOUNTER
----- Message from Mattie Pedraza MD sent at 3/7/2023 12:55 PM EST -----  Contact: Patient [phone]  Zpak/Tessalon 200 mg po tid #30  ----- Message -----  From: Tigre Corley  Sent: 3/7/2023  11:00 AM EST  To: Mattie Pedraza MD    Pt states having symptoms of coughing, body aches, and a slight fever. Testing for Covid it was negative. Pt requesting medication to subside symptoms. Please advise.         Azuna   [address] [place] [address] 965, [address]000 [facility]  Phone: [phone]

## 2023-03-21 ENCOUNTER — OFFICE VISIT (OUTPATIENT)
Dept: ORTHOPEDIC SURGERY | Age: 83
End: 2023-03-21

## 2023-03-21 DIAGNOSIS — S82.62XA CLOSED AVULSION FRACTURE OF LATERAL MALLEOLUS OF LEFT FIBULA, INITIAL ENCOUNTER: Primary | ICD-10-CM

## 2023-03-21 DIAGNOSIS — S93.491A SPRAIN OF ANTERIOR TALOFIBULAR LIGAMENT OF RIGHT ANKLE, INITIAL ENCOUNTER: ICD-10-CM

## 2023-03-21 NOTE — PROGRESS NOTES
HPI  Review of systems reviewed from Patient History Form and available in the patient's chart under the Media tab. PHYSICAL EXAMINATION:  Ms. Ale Ashley is a very pleasant 78 y.o.  female who presents today in no acute distress, awake, alert, and oriented. She is well dressed, nourished and  groomed. Patient with normal affect. Height is   , weight is  , There is no height or weight on file to calculate BMI. Resting respiratory rate is 16. Examination of the gait, showed that the patient walks with no limp in regular shoes bilateral WB. Examination of both ankles showing a good range of motion of the left ankle compare to the other side. There is minimal swelling that can be seen, no ecchymosis bilateral ankle. She has intact sensation and good pedal pulses. She has minimal tenderness on deep palpation over the lateral malleolus of the left ankle and ATF right ankle. IMAGING: Xrays, 3 views of the left ankle taken today in the office,  were reviewed, and showed a minimally displaced lateral malleolus avulsion fracture. X-rays were taken in the ED 1/28/2023, 3 views of the right ankle, and showed no fracture. No other abnormality. IMPRESSION:   1- Left ankle pain / lateral malleolus avulsion fracture. 2- Right ankle pain / ATF sprain. PLAN:  I assured the patient that the right ankle xray is negative for acute fracture. I discussed that the overall alignment of the left ankle fracture is still good and healing well. We discussed the risk of nonunion and or malunion. She will be WBAT, and start aggressive ROM and peroneal strengthening exercise. We will see her  back in 2 months at which time we will get a new xray of the left ankle.         Pillo Block MD

## 2023-05-10 ENCOUNTER — TELEPHONE (OUTPATIENT)
Dept: INTERNAL MEDICINE CLINIC | Age: 83
End: 2023-05-10

## 2023-05-10 NOTE — TELEPHONE ENCOUNTER
----- Message from Candi Bradshaw MD sent at 5/10/2023  1:05 PM EDT -----  Contact: [phone]  Okay to write excuse due to poor eyesight and medical problems  ----- Message -----  From: Abhishek Koo  Sent: 5/10/2023  10:48 AM EDT  To: Candi Bradshaw MD    Pt is requesting a statement from you to excuse her from jury duty. Pt doesn't have a ride to get there and her eyesight is not the best she states. please advise.

## 2023-05-11 RX ORDER — LEVOTHYROXINE SODIUM 0.07 MG/1
TABLET ORAL
Qty: 90 TABLET | Refills: 0 | Status: SHIPPED | OUTPATIENT
Start: 2023-05-11

## 2023-05-11 RX ORDER — METOPROLOL TARTRATE 50 MG/1
TABLET, FILM COATED ORAL
Qty: 180 TABLET | Refills: 0 | Status: SHIPPED | OUTPATIENT
Start: 2023-05-11

## 2023-05-11 RX ORDER — AMLODIPINE BESYLATE 5 MG/1
TABLET ORAL
Qty: 90 TABLET | Refills: 0 | Status: SHIPPED | OUTPATIENT
Start: 2023-05-11

## 2023-05-11 RX ORDER — SIMVASTATIN 20 MG
TABLET ORAL
Qty: 90 TABLET | Refills: 0 | Status: SHIPPED | OUTPATIENT
Start: 2023-05-11

## 2023-07-26 RX ORDER — LISINOPRIL 20 MG/1
TABLET ORAL
Qty: 90 TABLET | Refills: 1 | Status: SHIPPED | OUTPATIENT
Start: 2023-07-26

## 2023-08-17 ENCOUNTER — OFFICE VISIT (OUTPATIENT)
Dept: INTERNAL MEDICINE CLINIC | Age: 83
End: 2023-08-17

## 2023-08-17 VITALS
HEIGHT: 67 IN | HEART RATE: 70 BPM | BODY MASS INDEX: 19.3 KG/M2 | RESPIRATION RATE: 12 BRPM | SYSTOLIC BLOOD PRESSURE: 130 MMHG | DIASTOLIC BLOOD PRESSURE: 80 MMHG | WEIGHT: 123 LBS

## 2023-08-17 DIAGNOSIS — M72.0 DUPUYTREN'S CONTRACTURE OF BOTH HANDS: ICD-10-CM

## 2023-08-17 DIAGNOSIS — H34.11 CENTRAL RETINAL ARTERY OCCLUSION OF RIGHT EYE: ICD-10-CM

## 2023-08-17 DIAGNOSIS — I10 BENIGN ESSENTIAL HYPERTENSION: ICD-10-CM

## 2023-08-17 DIAGNOSIS — E78.5 HYPERLIPIDEMIA, UNSPECIFIED HYPERLIPIDEMIA TYPE: ICD-10-CM

## 2023-08-17 DIAGNOSIS — E03.9 ACQUIRED HYPOTHYROIDISM: ICD-10-CM

## 2023-08-17 DIAGNOSIS — I10 BENIGN ESSENTIAL HYPERTENSION: Primary | ICD-10-CM

## 2023-08-17 DIAGNOSIS — M81.0 OSTEOPOROSIS WITHOUT CURRENT PATHOLOGICAL FRACTURE, UNSPECIFIED OSTEOPOROSIS TYPE: ICD-10-CM

## 2023-08-17 PROCEDURE — 3079F DIAST BP 80-89 MM HG: CPT | Performed by: INTERNAL MEDICINE

## 2023-08-17 PROCEDURE — 99214 OFFICE O/P EST MOD 30 MIN: CPT | Performed by: INTERNAL MEDICINE

## 2023-08-17 PROCEDURE — 3075F SYST BP GE 130 - 139MM HG: CPT | Performed by: INTERNAL MEDICINE

## 2023-08-17 PROCEDURE — 1123F ACP DISCUSS/DSCN MKR DOCD: CPT | Performed by: INTERNAL MEDICINE

## 2023-08-17 RX ORDER — SIMVASTATIN 20 MG
TABLET ORAL
Qty: 90 TABLET | Refills: 0 | Status: SHIPPED | OUTPATIENT
Start: 2023-08-17

## 2023-08-17 RX ORDER — LEVOTHYROXINE SODIUM 0.07 MG/1
TABLET ORAL
Qty: 90 TABLET | Refills: 0 | Status: SHIPPED | OUTPATIENT
Start: 2023-08-17

## 2023-08-17 RX ORDER — AMLODIPINE BESYLATE 5 MG/1
TABLET ORAL
Qty: 90 TABLET | Refills: 0 | Status: SHIPPED | OUTPATIENT
Start: 2023-08-17

## 2023-08-17 RX ORDER — METOPROLOL TARTRATE 50 MG/1
50 TABLET, FILM COATED ORAL 2 TIMES DAILY
Qty: 180 TABLET | Refills: 0 | Status: SHIPPED | OUTPATIENT
Start: 2023-08-17

## 2023-08-17 ASSESSMENT — ENCOUNTER SYMPTOMS
SHORTNESS OF BREATH: 0
ABDOMINAL PAIN: 0
RHINORRHEA: 0
VOMITING: 0
NAUSEA: 0
WHEEZING: 0

## 2023-08-17 NOTE — PROGRESS NOTES
Subjective:      Patient ID: Paula Lloyd is a 78 y.o. female. HPI  Patient is here for follow up of hypertension, hyperlipidemia, hypothyroidism, central retinal artery occlusion and osteoporosis. She fell and hurt her face. She has sutures that need removed. Patient's BP is controlled on current medications. No CP or dyspnea. She has minimal pedal edema. Her cholesterol is at goal. No myalgias. Patient's hypothyroidism is stable on replacement therapy. No diarrhea. She has mild constipation. She takes Metamucil. No heat or cold intolerance. Her vision is unchanged. 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 She has osteoporosis. She is taking Calcium. No fracture issues. Is refusing any other treatment. She has hyponatremia. She is due to see nephrology. Review of Systems   Constitutional:  Negative for appetite change and fatigue. HENT:  Negative for postnasal drip and rhinorrhea. Respiratory:  Negative for shortness of breath and wheezing. Cardiovascular:  Negative for chest pain and palpitations. Gastrointestinal:  Negative for abdominal pain, nausea and vomiting. Musculoskeletal:  Negative for joint swelling. Skin:  Negative for rash. Neurological:  Negative for light-headedness. Psychiatric/Behavioral:  Negative for sleep disturbance. Current Outpatient Medications   Medication Instructions    amLODIPine (NORVASC) 5 MG tablet TAKE 1 TABLET DAILY    aspirin 81 mg, DAILY    azithromycin (ZITHROMAX) 250 MG tablet Take 2 tabs (500 mg) on Day 1, and take 1 tab (250 mg) on days 2 through 5.    calcium carbonate (OYSTER SHELL CALCIUM 500 MG) 1250 (500 Ca) MG tablet 1 tablet, Oral, DAILY    levothyroxine (SYNTHROID) 75 MCG tablet TAKE 1 TABLET DAILY    lisinopril (PRINIVIL;ZESTRIL) 20 MG tablet TAKE 1 TABLET DAILY    metoprolol tartrate (LOPRESSOR) 50 mg, Oral, 2 TIMES DAILY    Multiple Vitamin (MULTI-VITAMIN) TABS Take  by mouth.       NONFORMULARY Indications:

## 2023-08-18 LAB
ALBUMIN SERPL-MCNC: 4.7 G/DL (ref 3.4–5)
ALBUMIN/GLOB SERPL: 1.7 {RATIO} (ref 1.1–2.2)
ALP SERPL-CCNC: 63 U/L (ref 40–129)
ALT SERPL-CCNC: 23 U/L (ref 10–40)
ANION GAP SERPL CALCULATED.3IONS-SCNC: 12 MMOL/L (ref 3–16)
AST SERPL-CCNC: 28 U/L (ref 15–37)
BASOPHILS # BLD: 0 K/UL (ref 0–0.2)
BASOPHILS NFR BLD: 0.5 %
BILIRUB SERPL-MCNC: <0.2 MG/DL (ref 0–1)
BUN SERPL-MCNC: 10 MG/DL (ref 7–20)
CALCIUM SERPL-MCNC: 9.8 MG/DL (ref 8.3–10.6)
CHLORIDE SERPL-SCNC: 91 MMOL/L (ref 99–110)
CHOLEST SERPL-MCNC: 178 MG/DL (ref 0–199)
CO2 SERPL-SCNC: 28 MMOL/L (ref 21–32)
CREAT SERPL-MCNC: 0.9 MG/DL (ref 0.6–1.2)
DEPRECATED RDW RBC AUTO: 14.2 % (ref 12.4–15.4)
EOSINOPHIL # BLD: 0.2 K/UL (ref 0–0.6)
EOSINOPHIL NFR BLD: 2.5 %
GFR SERPLBLD CREATININE-BSD FMLA CKD-EPI: >60 ML/MIN/{1.73_M2}
GLUCOSE SERPL-MCNC: 84 MG/DL (ref 70–99)
HCT VFR BLD AUTO: 40.3 % (ref 36–48)
HDLC SERPL-MCNC: 64 MG/DL (ref 40–60)
HGB BLD-MCNC: 13.5 G/DL (ref 12–16)
LDLC SERPL CALC-MCNC: 87 MG/DL
LYMPHOCYTES # BLD: 1.8 K/UL (ref 1–5.1)
LYMPHOCYTES NFR BLD: 25.5 %
MCH RBC QN AUTO: 29 PG (ref 26–34)
MCHC RBC AUTO-ENTMCNC: 33.4 G/DL (ref 31–36)
MCV RBC AUTO: 86.9 FL (ref 80–100)
MONOCYTES # BLD: 0.8 K/UL (ref 0–1.3)
MONOCYTES NFR BLD: 11.1 %
NEUTROPHILS # BLD: 4.4 K/UL (ref 1.7–7.7)
NEUTROPHILS NFR BLD: 60.4 %
PLATELET # BLD AUTO: 295 K/UL (ref 135–450)
PMV BLD AUTO: 8.7 FL (ref 5–10.5)
POTASSIUM SERPL-SCNC: 5 MMOL/L (ref 3.5–5.1)
PROT SERPL-MCNC: 7.4 G/DL (ref 6.4–8.2)
RBC # BLD AUTO: 4.64 M/UL (ref 4–5.2)
SODIUM SERPL-SCNC: 131 MMOL/L (ref 136–145)
TRIGL SERPL-MCNC: 133 MG/DL (ref 0–150)
TSH SERPL DL<=0.005 MIU/L-ACNC: 1.02 UIU/ML (ref 0.27–4.2)
URATE SERPL-MCNC: 3.8 MG/DL (ref 2.6–6)
VLDLC SERPL CALC-MCNC: 27 MG/DL
WBC # BLD AUTO: 7.2 K/UL (ref 4–11)

## 2023-11-30 RX ORDER — AMLODIPINE BESYLATE 5 MG/1
TABLET ORAL
Qty: 90 TABLET | Refills: 0 | Status: SHIPPED | OUTPATIENT
Start: 2023-11-30

## 2023-12-13 RX ORDER — LEVOTHYROXINE SODIUM 0.07 MG/1
TABLET ORAL
Qty: 90 TABLET | Refills: 0 | Status: SHIPPED | OUTPATIENT
Start: 2023-12-13

## 2023-12-14 ENCOUNTER — OFFICE VISIT (OUTPATIENT)
Dept: INTERNAL MEDICINE CLINIC | Age: 83
End: 2023-12-14

## 2023-12-14 VITALS
DIASTOLIC BLOOD PRESSURE: 80 MMHG | RESPIRATION RATE: 12 BRPM | HEIGHT: 67 IN | WEIGHT: 126 LBS | SYSTOLIC BLOOD PRESSURE: 120 MMHG | BODY MASS INDEX: 19.78 KG/M2 | HEART RATE: 70 BPM

## 2023-12-14 DIAGNOSIS — E78.5 HYPERLIPIDEMIA, UNSPECIFIED HYPERLIPIDEMIA TYPE: ICD-10-CM

## 2023-12-14 DIAGNOSIS — E03.9 ACQUIRED HYPOTHYROIDISM: ICD-10-CM

## 2023-12-14 DIAGNOSIS — M72.0 DUPUYTREN'S CONTRACTURE OF BOTH HANDS: ICD-10-CM

## 2023-12-14 DIAGNOSIS — I10 BENIGN ESSENTIAL HYPERTENSION: Primary | ICD-10-CM

## 2023-12-14 DIAGNOSIS — M81.0 OSTEOPOROSIS WITHOUT CURRENT PATHOLOGICAL FRACTURE, UNSPECIFIED OSTEOPOROSIS TYPE: ICD-10-CM

## 2023-12-14 PROCEDURE — 1123F ACP DISCUSS/DSCN MKR DOCD: CPT | Performed by: INTERNAL MEDICINE

## 2023-12-14 PROCEDURE — 99214 OFFICE O/P EST MOD 30 MIN: CPT | Performed by: INTERNAL MEDICINE

## 2023-12-14 PROCEDURE — 3074F SYST BP LT 130 MM HG: CPT | Performed by: INTERNAL MEDICINE

## 2023-12-14 PROCEDURE — 3079F DIAST BP 80-89 MM HG: CPT | Performed by: INTERNAL MEDICINE

## 2023-12-14 RX ORDER — SIMVASTATIN 20 MG
TABLET ORAL
Qty: 90 TABLET | Refills: 0 | Status: SHIPPED | OUTPATIENT
Start: 2023-12-14

## 2023-12-14 RX ORDER — METOPROLOL TARTRATE 50 MG/1
50 TABLET, FILM COATED ORAL 2 TIMES DAILY
Qty: 180 TABLET | Refills: 0 | Status: SHIPPED | OUTPATIENT
Start: 2023-12-14

## 2023-12-14 NOTE — PROGRESS NOTES
Subjective:      Patient ID: Efrain Denton is a 78 y.o. female. HPI  Patient is here for follow up of hypertension, hyperlipidemia, hypothyroidism, central retinal artery occlusion and osteoporosis. Patient's BP is controlled on current medications. No CP or dyspnea. She has minimal pedal edema. Her cholesterol is at goal. No myalgias. Patient's hypothyroidism is stable on replacement therapy. No diarrhea. She has mild constipation. She takes Metamucil. No heat or cold intolerance. Her vision is unchanged. 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 She has osteoporosis. She is taking Calcium. No fracture issues. Is refusing any other treatment. Her sodium is stable and needs checked. Review of Systems   Constitutional:  Negative for appetite change and fatigue. HENT:  Negative for postnasal drip and rhinorrhea. Respiratory:  Negative for shortness of breath and wheezing. Cardiovascular:  Negative for chest pain and palpitations. Gastrointestinal:  Negative for abdominal pain, nausea and vomiting. Musculoskeletal:  Negative for joint swelling. Skin:  Negative for rash. Neurological:  Negative for light-headedness. Psychiatric/Behavioral:  Negative for sleep disturbance. Current Outpatient Medications   Medication Instructions    amLODIPine (NORVASC) 5 MG tablet TAKE 1 TABLET DAILY    aspirin 81 mg, DAILY    calcium carbonate (OYSTER SHELL CALCIUM 500 MG) 1250 (500 Ca) MG tablet 1 tablet, Oral, DAILY    levothyroxine (SYNTHROID) 75 MCG tablet TAKE 1 TABLET DAILY    lisinopril (PRINIVIL;ZESTRIL) 20 MG tablet TAKE 1 TABLET DAILY    metoprolol tartrate (LOPRESSOR) 50 mg, Oral, 2 TIMES DAILY    Multiple Vitamin (MULTI-VITAMIN) TABS Take  by mouth.       NONFORMULARY Indications: Chloracedin    simvastatin (ZOCOR) 20 MG tablet TAKE 1 TABLET EVERY EVENING    vitamin D 1000 units CAPS Oral       Past Medical History:   Diagnosis Date    Benign essential hypertension

## 2024-02-09 RX ORDER — LISINOPRIL 20 MG/1
20 TABLET ORAL DAILY
Qty: 30 TABLET | Refills: 0 | Status: SHIPPED | OUTPATIENT
Start: 2024-02-09 | End: 2024-03-10

## 2024-02-09 NOTE — TELEPHONE ENCOUNTER
Patient states she may be switching to PCP closer to her homes. Patient isn't sure yet but will update us this month with her decision.

## (undated) DEVICE — ELECTRODE,RADIOTRANSLUCENT,FOAM,3PK: Brand: MEDLINE

## (undated) DEVICE — ENDO CARRY-ON PROCEDURE KIT INCLUDES SUCTION TUBING, LUBRICANT, GAUZE, BIOHAZARD STICKER, TRANSPORT PAD AND INTERCEPT BEDSIDE KIT.: Brand: ENDO CARRY-ON PROCEDURE KIT